# Patient Record
Sex: FEMALE | Race: WHITE | NOT HISPANIC OR LATINO | Employment: FULL TIME | URBAN - METROPOLITAN AREA
[De-identification: names, ages, dates, MRNs, and addresses within clinical notes are randomized per-mention and may not be internally consistent; named-entity substitution may affect disease eponyms.]

---

## 2023-11-16 ENCOUNTER — HOSPITAL ENCOUNTER (EMERGENCY)
Facility: HOSPITAL | Age: 33
Discharge: HOME | End: 2023-11-16
Attending: EMERGENCY MEDICINE
Payer: COMMERCIAL

## 2023-11-16 ENCOUNTER — APPOINTMENT (OUTPATIENT)
Dept: RADIOLOGY | Facility: HOSPITAL | Age: 33
End: 2023-11-16
Payer: COMMERCIAL

## 2023-11-16 VITALS
SYSTOLIC BLOOD PRESSURE: 145 MMHG | HEART RATE: 86 BPM | RESPIRATION RATE: 16 BRPM | HEIGHT: 66 IN | OXYGEN SATURATION: 98 % | BODY MASS INDEX: 47.09 KG/M2 | DIASTOLIC BLOOD PRESSURE: 71 MMHG | TEMPERATURE: 97.8 F | WEIGHT: 293 LBS

## 2023-11-16 DIAGNOSIS — N20.1 URETERAL STONE: ICD-10-CM

## 2023-11-16 DIAGNOSIS — N30.01 ACUTE CYSTITIS WITH HEMATURIA: Primary | ICD-10-CM

## 2023-11-16 PROBLEM — F32.5 MAJOR DEPRESSIVE DISORDER WITH SINGLE EPISODE, IN FULL REMISSION (CMS-HCC): Status: ACTIVE | Noted: 2023-11-16

## 2023-11-16 PROBLEM — J02.9 PHARYNGITIS: Status: ACTIVE | Noted: 2023-11-16

## 2023-11-16 PROBLEM — F41.1 GAD (GENERALIZED ANXIETY DISORDER): Status: ACTIVE | Noted: 2023-11-16

## 2023-11-16 PROBLEM — F41.0 PANIC DISORDER WITHOUT AGORAPHOBIA WITH MODERATE PANIC ATTACKS: Status: ACTIVE | Noted: 2023-11-16

## 2023-11-16 PROBLEM — H92.01 ACUTE PAIN OF RIGHT EAR: Status: ACTIVE | Noted: 2023-11-16

## 2023-11-16 PROBLEM — Z97.3 WEARS EYEGLASSES: Status: ACTIVE | Noted: 2023-11-16

## 2023-11-16 LAB
ALBUMIN SERPL BCP-MCNC: 4.8 G/DL (ref 3.4–5)
ALP SERPL-CCNC: 37 U/L (ref 33–110)
ALT SERPL W P-5'-P-CCNC: 32 U/L (ref 7–45)
ANION GAP SERPL CALC-SCNC: 15 MMOL/L (ref 10–20)
APPEARANCE UR: ABNORMAL
AST SERPL W P-5'-P-CCNC: 26 U/L (ref 9–39)
BACTERIA #/AREA URNS AUTO: ABNORMAL /HPF
BASOPHILS # BLD AUTO: 0.04 X10*3/UL (ref 0–0.1)
BASOPHILS NFR BLD AUTO: 0.3 %
BILIRUB SERPL-MCNC: 0.5 MG/DL (ref 0–1.2)
BILIRUB UR STRIP.AUTO-MCNC: NEGATIVE MG/DL
BUN SERPL-MCNC: 14 MG/DL (ref 6–23)
CALCIUM SERPL-MCNC: 9.7 MG/DL (ref 8.6–10.6)
CHLORIDE SERPL-SCNC: 102 MMOL/L (ref 98–107)
CO2 SERPL-SCNC: 27 MMOL/L (ref 21–32)
COLOR UR: ABNORMAL
CREAT SERPL-MCNC: 1.14 MG/DL (ref 0.5–1.05)
EOSINOPHIL # BLD AUTO: 0.05 X10*3/UL (ref 0–0.7)
EOSINOPHIL NFR BLD AUTO: 0.3 %
ERYTHROCYTE [DISTWIDTH] IN BLOOD BY AUTOMATED COUNT: 14.6 % (ref 11.5–14.5)
GFR SERPL CREATININE-BSD FRML MDRD: 66 ML/MIN/1.73M*2
GLUCOSE SERPL-MCNC: 116 MG/DL (ref 74–99)
GLUCOSE UR STRIP.AUTO-MCNC: NEGATIVE MG/DL
HCT VFR BLD AUTO: 45 % (ref 36–46)
HGB BLD-MCNC: 14.7 G/DL (ref 12–16)
HOLD SPECIMEN: NORMAL
IMM GRANULOCYTES # BLD AUTO: 0.06 X10*3/UL (ref 0–0.7)
IMM GRANULOCYTES NFR BLD AUTO: 0.4 % (ref 0–0.9)
KETONES UR STRIP.AUTO-MCNC: ABNORMAL MG/DL
LEUKOCYTE ESTERASE UR QL STRIP.AUTO: ABNORMAL
LIPASE SERPL-CCNC: 18 U/L (ref 9–82)
LYMPHOCYTES # BLD AUTO: 1.89 X10*3/UL (ref 1.2–4.8)
LYMPHOCYTES NFR BLD AUTO: 12.4 %
MCH RBC QN AUTO: 27.7 PG (ref 26–34)
MCHC RBC AUTO-ENTMCNC: 32.7 G/DL (ref 32–36)
MCV RBC AUTO: 85 FL (ref 80–100)
MONOCYTES # BLD AUTO: 0.69 X10*3/UL (ref 0.1–1)
MONOCYTES NFR BLD AUTO: 4.5 %
MUCOUS THREADS #/AREA URNS AUTO: ABNORMAL /LPF
NEUTROPHILS # BLD AUTO: 12.47 X10*3/UL (ref 1.2–7.7)
NEUTROPHILS NFR BLD AUTO: 82.1 %
NITRITE UR QL STRIP.AUTO: POSITIVE
NRBC BLD-RTO: 0 /100 WBCS (ref 0–0)
PH UR STRIP.AUTO: 5 [PH]
PLATELET # BLD AUTO: 344 X10*3/UL (ref 150–450)
POTASSIUM SERPL-SCNC: 3.9 MMOL/L (ref 3.5–5.3)
PROT SERPL-MCNC: 8.3 G/DL (ref 6.4–8.2)
PROT UR STRIP.AUTO-MCNC: ABNORMAL MG/DL
RBC # BLD AUTO: 5.31 X10*6/UL (ref 4–5.2)
RBC # UR STRIP.AUTO: ABNORMAL /UL
RBC #/AREA URNS AUTO: >20 /HPF
SODIUM SERPL-SCNC: 140 MMOL/L (ref 136–145)
SP GR UR STRIP.AUTO: 1.03
SQUAMOUS #/AREA URNS AUTO: ABNORMAL /HPF
UROBILINOGEN UR STRIP.AUTO-MCNC: <2 MG/DL
WBC # BLD AUTO: 15.2 X10*3/UL (ref 4.4–11.3)
WBC #/AREA URNS AUTO: >50 /HPF

## 2023-11-16 PROCEDURE — 99284 EMERGENCY DEPT VISIT MOD MDM: CPT | Mod: 25

## 2023-11-16 PROCEDURE — 99285 EMERGENCY DEPT VISIT HI MDM: CPT | Performed by: EMERGENCY MEDICINE

## 2023-11-16 PROCEDURE — 99285 EMERGENCY DEPT VISIT HI MDM: CPT | Mod: 25 | Performed by: EMERGENCY MEDICINE

## 2023-11-16 PROCEDURE — 87086 URINE CULTURE/COLONY COUNT: CPT

## 2023-11-16 PROCEDURE — 85025 COMPLETE CBC W/AUTO DIFF WBC: CPT

## 2023-11-16 PROCEDURE — 2500000004 HC RX 250 GENERAL PHARMACY W/ HCPCS (ALT 636 FOR OP/ED)

## 2023-11-16 PROCEDURE — 96361 HYDRATE IV INFUSION ADD-ON: CPT

## 2023-11-16 PROCEDURE — 87186 SC STD MICRODIL/AGAR DIL: CPT

## 2023-11-16 PROCEDURE — 2550000001 HC RX 255 CONTRASTS: Performed by: EMERGENCY MEDICINE

## 2023-11-16 PROCEDURE — 80053 COMPREHEN METABOLIC PANEL: CPT

## 2023-11-16 PROCEDURE — 96375 TX/PRO/DX INJ NEW DRUG ADDON: CPT

## 2023-11-16 PROCEDURE — 81001 URINALYSIS AUTO W/SCOPE: CPT

## 2023-11-16 PROCEDURE — 74177 CT ABD & PELVIS W/CONTRAST: CPT | Performed by: STUDENT IN AN ORGANIZED HEALTH CARE EDUCATION/TRAINING PROGRAM

## 2023-11-16 PROCEDURE — 74177 CT ABD & PELVIS W/CONTRAST: CPT

## 2023-11-16 PROCEDURE — 96365 THER/PROPH/DIAG IV INF INIT: CPT

## 2023-11-16 PROCEDURE — 83690 ASSAY OF LIPASE: CPT

## 2023-11-16 PROCEDURE — 36415 COLL VENOUS BLD VENIPUNCTURE: CPT

## 2023-11-16 RX ORDER — CEFDINIR 300 MG/1
300 CAPSULE ORAL 2 TIMES DAILY
Qty: 20 CAPSULE | Refills: 0 | Status: SHIPPED | OUTPATIENT
Start: 2023-11-16 | End: 2023-11-28 | Stop reason: ALTCHOICE

## 2023-11-16 RX ORDER — DESOGESTREL AND ETHINYL ESTRADIOL 21-5 (28)
KIT ORAL
COMMUNITY
End: 2023-11-20 | Stop reason: ALTCHOICE

## 2023-11-16 RX ORDER — SERTRALINE HYDROCHLORIDE 100 MG/1
1 TABLET, FILM COATED ORAL DAILY
COMMUNITY
Start: 2019-10-10 | End: 2023-12-04 | Stop reason: SDUPTHER

## 2023-11-16 RX ORDER — OXYCODONE AND ACETAMINOPHEN 5; 325 MG/1; MG/1
1 TABLET ORAL EVERY 6 HOURS PRN
Qty: 5 TABLET | Refills: 0 | Status: SHIPPED | OUTPATIENT
Start: 2023-11-16 | End: 2023-11-20 | Stop reason: ALTCHOICE

## 2023-11-16 RX ORDER — HYDROXYZINE HYDROCHLORIDE 10 MG/1
TABLET, FILM COATED ORAL
COMMUNITY
Start: 2019-09-19 | End: 2023-11-20 | Stop reason: ALTCHOICE

## 2023-11-16 RX ORDER — ONDANSETRON HYDROCHLORIDE 2 MG/ML
4 INJECTION, SOLUTION INTRAVENOUS ONCE
Status: COMPLETED | OUTPATIENT
Start: 2023-11-16 | End: 2023-11-16

## 2023-11-16 RX ORDER — KETOROLAC TROMETHAMINE 30 MG/ML
30 INJECTION, SOLUTION INTRAMUSCULAR; INTRAVENOUS ONCE
Status: COMPLETED | OUTPATIENT
Start: 2023-11-16 | End: 2023-11-16

## 2023-11-16 RX ORDER — CEFTRIAXONE 1 G/50ML
1 INJECTION, SOLUTION INTRAVENOUS EVERY 12 HOURS
Status: DISCONTINUED | OUTPATIENT
Start: 2023-11-16 | End: 2023-11-16 | Stop reason: HOSPADM

## 2023-11-16 RX ADMIN — SODIUM CHLORIDE, POTASSIUM CHLORIDE, SODIUM LACTATE AND CALCIUM CHLORIDE 1000 ML: 600; 310; 30; 20 INJECTION, SOLUTION INTRAVENOUS at 07:46

## 2023-11-16 RX ADMIN — ONDANSETRON 4 MG: 2 INJECTION INTRAMUSCULAR; INTRAVENOUS at 07:47

## 2023-11-16 RX ADMIN — CEFTRIAXONE SODIUM 1 G: 1 INJECTION, SOLUTION INTRAVENOUS at 09:37

## 2023-11-16 RX ADMIN — KETOROLAC TROMETHAMINE 30 MG: 30 INJECTION, SOLUTION INTRAMUSCULAR; INTRAVENOUS at 09:37

## 2023-11-16 RX ADMIN — SODIUM CHLORIDE 1000 ML: 0.9 INJECTION, SOLUTION INTRAVENOUS at 09:05

## 2023-11-16 RX ADMIN — IOHEXOL 80 ML: 350 INJECTION, SOLUTION INTRAVENOUS at 08:04

## 2023-11-16 ASSESSMENT — LIFESTYLE VARIABLES
HAVE YOU EVER FELT YOU SHOULD CUT DOWN ON YOUR DRINKING: NO
EVER HAD A DRINK FIRST THING IN THE MORNING TO STEADY YOUR NERVES TO GET RID OF A HANGOVER: NO
EVER FELT BAD OR GUILTY ABOUT YOUR DRINKING: NO
HAVE PEOPLE ANNOYED YOU BY CRITICIZING YOUR DRINKING: NO
REASON UNABLE TO ASSESS: NO

## 2023-11-16 ASSESSMENT — COLUMBIA-SUICIDE SEVERITY RATING SCALE - C-SSRS
5. HAVE YOU STARTED TO WORK OUT OR WORKED OUT THE DETAILS OF HOW TO KILL YOURSELF? DO YOU INTEND TO CARRY OUT THIS PLAN?: NO
6. HAVE YOU EVER DONE ANYTHING, STARTED TO DO ANYTHING, OR PREPARED TO DO ANYTHING TO END YOUR LIFE?: NO
1. IN THE PAST MONTH, HAVE YOU WISHED YOU WERE DEAD OR WISHED YOU COULD GO TO SLEEP AND NOT WAKE UP?: NO
4. HAVE YOU HAD THESE THOUGHTS AND HAD SOME INTENTION OF ACTING ON THEM?: NO
2. HAVE YOU ACTUALLY HAD ANY THOUGHTS OF KILLING YOURSELF?: NO

## 2023-11-16 NOTE — ED NOTES
Pt discharge to home IV removed intact, bleeding controlled, Vitals stable, reviewed discharge instructions with patient and  prescriptions. Pt ambulated out of ER.      Lisbeth Nunez RN  11/16/23 7093

## 2023-11-16 NOTE — DISCHARGE INSTRUCTIONS
Please return to the emergency department, should you have any worsening symptoms, are unable to get an appointment with your primary care physician within the discussed time frame, or have any further concerns.     Return to the emergency department should you experience fevers, chills no improvement of her pain in the next few days,, or any decreased urine output.      Please follow up with: Urology in the next few days.  Please call the clinic at [phone] for an appointment.    You make take ibuprofen or tylenol for pain. You may alternate ibuprofen and tylenol every 6 hours for better pain control.    Do not exceed 2400mg of ibuprofen or 4000mg of tylenol in a 24 hour period.    If you were given antibiotics, please complete the entire course. If you are unable to tolerate your antibiotics, please follow up with your primary care doctor or return to the emergency department.

## 2023-11-16 NOTE — ED TRIAGE NOTES
PT to the ED for RLQ pain onset last night. PT tenderness in the RLQ but has relief upon palpation over the area. C/o nausea no vomiting. No urinary symptoms or vaginal bleeding.

## 2023-11-16 NOTE — ED PROVIDER NOTES
HPI:      Limitations to History: none   Additional History Obtained from: none   External records reviewed: none     Chief Complaint   Patient presents with    Abdominal Pain          Priscilla Lam is a 32 y.o. female with no past medical history presenting to the ED with right lower quadrant abdominal pain that woke her from sleep at 2 AM this morning.  Patient noted some nausea with the pain, but denies any vomiting.  The pain does not radiate to any other areas, but feels like a sharp stabbing pain.  Patient denies any fevers, chills, shortness of breath, dysuria, hematuria, or any vaginal discharge.  Patient denies any prior episodes of this.       Past Medical History:   Diagnosis Date    Major depressive disorder, single episode, mild (CMS/[facility]) 01/29/2020    Major depressive disorder, single episode, mild degree    Other general symptoms and signs 02/16/2018    Flu-like symptoms    Personal history of other diseases of the nervous system and sense organs     History of myopia    Personal history of other specified conditions 02/16/2018    History of fever     Past Surgical History:   Procedure Laterality Date    OTHER SURGICAL HISTORY  09/19/2019    [place] tooth extraction        No Known Allergies    --------------------------------------------------------------------------------------------------------------------------------------    VS: As documented in the triage note and EMR flowsheet from this visit were reviewed.  Temp 37.6 °C (99.7 °F) HR 83 BP (!) 160/98 RR 18 Sat 97 % on      Physical Exam:  GEN:  no acute distress, appears comfortable. Conversational and appropriate.    HEENT: Normocephalic, atraumatic. Conjunctiva pink with no redness or exudates. Hearing grossly intact. Moist mucous membranes.  CARDIO: Normal rate and regular rhythm. Normal S1, S2  without murmurs, rubs, or gallops.   PULM: Clear to auscultation bilaterally. No rales, rhonchi, or wheezes. No accessory muscle use or stridor.  Speaking in full sentences.  GI: Soft, tenderness to palpation of the right lower quadrant, non-distended. No rebound tenderness or guarding.  Minimal CVA tenderness.  SKIN: Warm and dry, no rashes, lesions, petechiae, or purpura.  MSK: ROM intact in all 4 extremities without contractures or pain. No peripheral edema, contusions, or wounds.    NEURO: A&Ox3, No focal findings identified. No confusion or gross mental status changes.  PSYCH: Appropriate mood and behavior, converses and responds appropriately during exam.        ---------------------------------------------------------------------------------------------------------------------------------------    Given in the ED:   Medications   ondansetron (Zofran) injection 4 mg (4 mg intravenous Given 11/16/23 0747)   lactated Ringer's bolus 1,000 mL (0 mL intravenous Stopped 11/16/23 0846)   iohexol (OMNIPaque) 350 mg iodine/mL solution 80 mL (80 mL intravenous Given 11/16/23 0804)   sodium chloride 0.9 % bolus 1,000 mL (0 mL intravenous Stopped 11/16/23 0935)   ketorolac (Toradol) injection 30 mg (30 mg intravenous Given 11/16/23 0937)          Work up: All labs and imaging were independently reviewed by me.    Labs Reviewed   URINE CULTURE - Abnormal       Result Value    Urine Culture >100,000 Enteric bacilli (*)     Narrative:     Identification and/or susceptibilities to follow   CBC WITH AUTO DIFFERENTIAL - Abnormal    WBC 15.2 (*)     nRBC 0.0      RBC 5.31 (*)     Hemoglobin 14.7      Hematocrit 45.0      MCV 85      MCH 27.7      MCHC 32.7      RDW 14.6 (*)     Platelets 344      Neutrophils % 82.1      Immature Granulocytes %, Automated 0.4      Lymphocytes % 12.4      Monocytes % 4.5      Eosinophils % 0.3      Basophils % 0.3      Neutrophils Absolute 12.47 (*)     Immature Granulocytes Absolute, Automated 0.06      Lymphocytes Absolute 1.89      Monocytes Absolute 0.69      Eosinophils Absolute 0.05      Basophils Absolute 0.04     COMPREHENSIVE  METABOLIC PANEL - Abnormal    Glucose 116 (*)     Sodium 140      Potassium 3.9      Chloride 102      Bicarbonate 27      Anion Gap 15      Urea Nitrogen 14      Creatinine 1.14 (*)     eGFR 66      Calcium 9.7      Albumin 4.8      Alkaline Phosphatase 37      Total Protein 8.3 (*)     AST 26      Bilirubin, Total 0.5      ALT 32     URINALYSIS WITH REFLEX MICROSCOPIC AND CULTURE - Abnormal    Color, Urine Lulu (*)     Appearance, Urine Hazy (*)     Specific Gravity, Urine 1.030      pH, Urine 5.0      Protein, Urine 100 (2+) (*)     Glucose, Urine NEGATIVE      Blood, Urine LARGE (3+) (*)     Ketones, Urine 5 (TRACE) (*)     Bilirubin, Urine NEGATIVE      Urobilinogen, Urine <2.0      Nitrite, Urine POSITIVE (*)     Leukocyte Esterase, Urine MODERATE (2+) (*)    MICROSCOPIC ONLY, URINE - Abnormal    WBC, Urine >50 (*)     RBC, Urine >20 (*)     Squamous Epithelial Cells, Urine 1-9 (SPARSE)      Bacteria, Urine 3+ (*)     Mucus, Urine 4+     LIPASE - Normal    Lipase 18      Narrative:     Venipuncture immediately after or during the administration of Metamizole may lead to falsely low results. Testing should be performed immediately prior to Metamizole dosing.   URINALYSIS WITH REFLEX MICROSCOPIC AND CULTURE    Narrative:     The following orders were created for panel order Urinalysis with Reflex Microscopic and Culture.  Procedure                               Abnormality         Status                     ---------                               -----------         ------                     Urinalysis with Reflex M...[[id number]]  Abnormal            Final result               Extra Urine Gray Tube[[id number]]                            Final result                 Please view results for these tests on the individual orders.   EXTRA URINE GRAY TUBE    Extra Tube Hold for add-ons.         CT abdomen pelvis w IV contrast   Final Result   1.  Mild right hydroureteronephrosis, with trace right perinephric   fat  stranding and delayed nephrogram secondary to obstructive 7 mm   distal right ureteral stone at the level of the right UVJ.   2. Findings suggestive hepatic steatosis for correlation with liver   function tests.   3. Additional findings as described above.        I personally reviewed the images/study and I agree with the findings   as stated by Chato Metzger. This study was   interpreted at [facility],   [place], [place].        MACRO:   None        Signed by: Dayron Alonso 11/16/2023 9:23 AM   Dictation workstation:   [id number]          Medications   ondansetron (Zofran) injection 4 mg (4 mg intravenous Given 11/16/23 0747)   lactated Ringer's bolus 1,000 mL (0 mL intravenous Stopped 11/16/23 0846)   iohexol (OMNIPaque) 350 mg iodine/mL solution 80 mL (80 mL intravenous Given 11/16/23 0804)   sodium chloride 0.9 % bolus 1,000 mL (0 mL intravenous Stopped 11/16/23 0935)   ketorolac (Toradol) injection 30 mg (30 mg intravenous Given 11/16/23 0937)       ED Course as of 11/17/23 1207   Thu Nov 16, 2023   0830 UA with +pyruia, concerns for infection, started on ceftriaxone. Given additional 1L IVF [AD]   0845 Given IVF, pain medications  [AD]   0959 Ct ABD/PEL with + urethral  [AD]      ED Course User Index  [AD] Denton Malloy, DO         Diagnoses as of 11/17/23 1207   Acute cystitis with hematuria   Ureteral stone           MDM:  Briefly, this is a 33-year-old female with no past medical history presenting to the ED with right lower quadrant abdominal pain that woke her from sleep at 2 AM this morning, concerning for kidney stone and was found to have a 7 mm obstructive stone, with mild right hydro ureter, which I do believe is causing her pain.  She was also found to have a positive UTI, given Rocephin IV.  Pain control with IV pain medications.  Recent labs were drawn and discussed findings of mildly elevated creatinine and elevated white blood cell count,  likely related to UTI, and obstructing stone.  At this time, less concern for infected stone given overall well-appearing female.  Will be prescribed antibiotics for UTI.  Discussed outpatient instructions - Patient will have her creatinine checked at her follow-up appoint with her primary care physician next week.  She was given instructions to follow-up with urology within the next few days for further management.  She is comfortable plan for discharge home with urology follow-up and recheck of creatinine and her primary care appointment.  Advised to return to the emergency department should she have any worsening symptoms, fevers, inability to urinate, or significant blood in the urine.      Impression:   1. Acute cystitis with hematuria    2. Ureteral stone        Social Determinants of Health: Patient notes that she has a primary care appointment next week, and agrees to    Plan and Disposition:   Discharge home, return precautions given.        Return if worse. They understand return precautions and discharge instructions. Patient was in agreement with this plan.            Denton Floyd DO  Emergency Medicine, PGY-2      Patient was seen and evaluated by the attending physician. The attending ED physician agrees with the plan. Patient and/or patient´s representative was counseled regarding labs, imaging, likely diagnosis, and plan. All questions were answered.    Disclaimer: This note was dictated by speech recognition.  Attempt at proofreading was made to minimize errors.  Errors in transcription may be present.  Please call if questions.    I saw and evaluated the patient. I personally obtained the key and critical portions of the history and physical exam or was physically present for key and critical portions performed by the resident/fellow/CHIO. I reviewed the resident/fellow/CHIO's documentation and discussed the patient with the resident/fellow/CHIO. I agree with the resident/fellow's medical decision  making as documented in the note.    Also, I had a long discussion with the patient and she is aware of the size of the stone, the fact that her urinary tract infection could potentially lead to an infected stone, and that her creatinine is slightly elevated which could lead to a severe ISAÍAS.  However at this time she is asymptomatic with pain that is very well controlled.  She agrees to stay very well-hydrated and will take antibiotics at home.  She also agrees to follow-up with her primary care provider as well as urology in the outpatient setting and finally agrees to very strict return precautions.  If she has any worsening pain, infectious signs or symptoms, fevers, change in urinary output, or other concerning symptoms she agrees to come immediately back to the emergency department.  At this time I do think that her clinical picture is consistent with a kidney stone and a UTI rather than a truly infected stone.    Prasanna Smith    Emergency Medicine         Prasanna Smith MD  11/17/23 1208       Prasanna Smith MD  11/17/23 1209       Denton Malloy, DO  Resident  12/05/23 1895       Prasanna Smith MD  12/05/23 7695

## 2023-11-18 LAB — BACTERIA UR CULT: ABNORMAL

## 2023-11-20 ENCOUNTER — OFFICE VISIT (OUTPATIENT)
Dept: PRIMARY CARE | Facility: CLINIC | Age: 33
End: 2023-11-20
Payer: COMMERCIAL

## 2023-11-20 ENCOUNTER — APPOINTMENT (OUTPATIENT)
Dept: PRIMARY CARE | Facility: CLINIC | Age: 33
End: 2023-11-20
Payer: COMMERCIAL

## 2023-11-20 VITALS
OXYGEN SATURATION: 98 % | WEIGHT: 293 LBS | DIASTOLIC BLOOD PRESSURE: 88 MMHG | HEART RATE: 88 BPM | HEIGHT: 66 IN | SYSTOLIC BLOOD PRESSURE: 134 MMHG | BODY MASS INDEX: 47.09 KG/M2

## 2023-11-20 DIAGNOSIS — N20.1 URETERAL STONE: Primary | ICD-10-CM

## 2023-11-20 DIAGNOSIS — F32.5 MAJOR DEPRESSIVE DISORDER WITH SINGLE EPISODE, IN FULL REMISSION (CMS-HCC): ICD-10-CM

## 2023-11-20 DIAGNOSIS — I10 ELEVATED BLOOD PRESSURE READING IN OFFICE WITH DIAGNOSIS OF HYPERTENSION: ICD-10-CM

## 2023-11-20 DIAGNOSIS — E66.1 CLASS 3 DRUG-INDUCED OBESITY WITH BODY MASS INDEX (BMI) OF 45.0 TO 49.9 IN ADULT, UNSPECIFIED WHETHER SERIOUS COMORBIDITY PRESENT (MULTI): ICD-10-CM

## 2023-11-20 DIAGNOSIS — Z78.9 USES BIRTH CONTROL: ICD-10-CM

## 2023-11-20 DIAGNOSIS — F41.1 GAD (GENERALIZED ANXIETY DISORDER): ICD-10-CM

## 2023-11-20 DIAGNOSIS — Z00.00 HEALTHCARE MAINTENANCE: ICD-10-CM

## 2023-11-20 DIAGNOSIS — Z13.220 SCREENING, LIPID: ICD-10-CM

## 2023-11-20 DIAGNOSIS — E55.9 VITAMIN D DEFICIENCY: ICD-10-CM

## 2023-11-20 DIAGNOSIS — R53.83 OTHER FATIGUE: ICD-10-CM

## 2023-11-20 DIAGNOSIS — Z01.419 WELL WOMAN EXAM: ICD-10-CM

## 2023-11-20 PROBLEM — J02.9 PHARYNGITIS: Status: RESOLVED | Noted: 2023-11-16 | Resolved: 2023-11-20

## 2023-11-20 PROBLEM — E66.813 CLASS 3 DRUG-INDUCED OBESITY WITH BODY MASS INDEX (BMI) OF 45.0 TO 49.9 IN ADULT: Status: ACTIVE | Noted: 2023-11-20

## 2023-11-20 PROCEDURE — 3075F SYST BP GE 130 - 139MM HG: CPT | Performed by: NURSE PRACTITIONER

## 2023-11-20 PROCEDURE — 3008F BODY MASS INDEX DOCD: CPT | Performed by: NURSE PRACTITIONER

## 2023-11-20 PROCEDURE — 99204 OFFICE O/P NEW MOD 45 MIN: CPT | Performed by: NURSE PRACTITIONER

## 2023-11-20 PROCEDURE — 1036F TOBACCO NON-USER: CPT | Performed by: NURSE PRACTITIONER

## 2023-11-20 PROCEDURE — 3079F DIAST BP 80-89 MM HG: CPT | Performed by: NURSE PRACTITIONER

## 2023-11-20 RX ORDER — NORETHINDRONE 0.35 MG/1
1 TABLET ORAL DAILY
COMMUNITY
End: 2023-11-28 | Stop reason: SDUPTHER

## 2023-11-20 ASSESSMENT — PATIENT HEALTH QUESTIONNAIRE - PHQ9
1. LITTLE INTEREST OR PLEASURE IN DOING THINGS: NOT AT ALL
2. FEELING DOWN, DEPRESSED OR HOPELESS: NOT AT ALL
SUM OF ALL RESPONSES TO PHQ9 QUESTIONS 1 & 2: 0

## 2023-11-20 NOTE — PROGRESS NOTES
General Medical Management Note    32 y.o. female presents to establish as a new pt.   Last CPE approx 5 yrs ago    HPI    States that she was in the ER 11/16/23 for right ureteral stone.   Was instructed to follow up with urology  Currently denies abd pain or urinary discomfort.   Still taking Omnicef    Anxiety and depression:   Med: Zoloft  States she has been on med since 2019.   Managed by an online provider at [facility]    Birth control:   Managed by an online provider at [facility].  When questioned about her dad's blood clots-she believes it related to an old leg injury. She is going to verify with dad.     Blood pressure was elevated in ER.   Denies CP, SOB, dizziness, HA        Past Medical History:   Diagnosis Date    Kidney stones     Major depressive disorder, single episode, mild (CMS/HCC) 01/29/2020    Major depressive disorder, single episode, mild degree    Other general symptoms and signs 02/16/2018    Flu-like symptoms    Personal history of other diseases of the nervous system and sense organs     History of myopia    Personal history of other specified conditions 02/16/2018    History of fever      Past Surgical History:   Procedure Laterality Date    OTHER SURGICAL HISTORY  09/19/2019    [place] tooth extraction    WISDOM TOOTH EXTRACTION       Family History   Problem Relation Name Age of Onset    Hypertension Mother Lawanda Lam     Blood clot Father Emilio Lam     Hypertension Maternal Grandmother Ivory Beasley     Pancreatic cancer Maternal Grandmother Ivory Beasley       Social History     Socioeconomic History    Marital status: Single     Spouse name: Not on file    Number of children: Not on file    Years of education: Not on file    Highest education level: Not on file   Occupational History    Occupation: CT tech   Tobacco Use    Smoking status: Never    Smokeless tobacco: Never   Substance and Sexual Activity    Alcohol use: Yes     Alcohol/week: 2.0 standard  "drinks of alcohol     Types: 2 Standard drinks or equivalent per week    Drug use: Never    Sexual activity: Never   Other Topics Concern    Not on file   Social History Narrative    Not on file     Social Determinants of Health     Financial Resource Strain: Not on file   Food Insecurity: Not on file   Transportation Needs: Not on file   Physical Activity: Not on file   Stress: Not on file   Social Connections: Not on file   Intimate Partner Violence: Not on file   Housing Stability: Not on file       Current Outpatient Medications on File Prior to Visit   Medication Sig Dispense Refill    cefdinir (Omnicef) 300 mg capsule Take 1 capsule (300 mg) by mouth 2 times a day for 10 days. 20 capsule 0    norethindrone (Micronor) 0.35 mg tablet Take 1 tablet (0.35 mg) by mouth once daily.      sertraline (Zoloft) 100 mg tablet Take 1 tablet (100 mg) by mouth once daily.      [DISCONTINUED] hydrOXYzine HCL (Atarax) 10 mg tablet Take by mouth.      [] oxyCODONE-acetaminophen (Percocet) 5-325 mg tablet Take 1 tablet by mouth every 6 hours if needed for severe pain (7 - 10) for up to 3 days. 5 tablet 0    [DISCONTINUED] desog-e.estradioL/e.estradioL (Kariva) 0.15-0.02 mgx21 /0.01 mg x 5 tablet Take by mouth.       No current facility-administered medications on file prior to visit.       No Known Allergies      Visit Vitals  /88   Pulse 88   Ht 1.676 m (5' 6\")   Wt 139 kg (306 lb)   SpO2 98%   BMI 49.39 kg/m²   OB Status Having periods   Smoking Status Never   BSA 2.54 m²        PHYSICAL EXAM:  Alert and oriented x3.  Eyes: EOM grossly intact  Neck supple without lymph adenopathy or carotid bruit.  No masses or thyromegaly  Heart regular rate and rhythm without murmur.  Lungs clear to auscultation.  Legs without edema.  Gait is non-antalgic  Speech clear.  Hearing adequate.      DIAGNOSIS/PLAN:  New patient    1. Ureteral stone  - Referral to Urology; Future    2. NED (generalized anxiety disorder)  Follow-up with " specialist per their discretion/direction.     3. Major depressive disorder with single episode, in full remission (CMS/[facility])  Follow-up with specialist per their discretion/direction.     4. 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   Patient also encouraged to increase water intake to 80 ounces/day.   Start or continue exercise for at least 30 minutes a day on most days of the week.     offers various ways to learn about healthy eating and healthy weight loss.       5. Uses birth control/ Well woman exam  - Referral to Obstetrics / Gynecology; Future  It is important to clarify with dad if his blood clots are related to a prior injury. This information should be communicated with provider ordering birth control.      6. Elevated blood pressure reading in office without diagnosis of HTN:      Discussed importance of good blood pressure control to avoid long-term complications such as heart attack and stroke.  Patient is aware that blood pressure goal is less than 130/80.   Maintaining a regular exercise program and body mass index (BMI) less than 25 as well as a diet lower in carbohydrates will help reach these goals.   Info regarding the DASH diet:  https://www.nhlbi.nih.gov/health-topics/dash-eating-plan.  If you are monitoring your blood pressure at home, please bring your blood pressure cuff at least once a year so we can complete comparative readings.  She will check her b/p at work. Report readings at visit.       Medications refills will be completed as discussed.     Any labs or testing that is ordered will be reviewed and the results will be in your chart .   You can review these via  [employer].     Follow up for CPE. Labs ordered    Prescriptions will not be filled unless you are compliant with your follow-up  appointments or have a follow-up appointment scheduled as per the instruction of your provider. Refills for medications should be requested at the time of your office visit.     Please allow one week for refill requests to be completed.     Contact office with any questions or concerns.   Preferred communication is via  [employer]  Please contact [email or web address] if having issues with  [employer]    Chelly Wong [facility]  [address], [address]  [place] [address]  Phone: [phone]    **Charting was completed using voice recognition technology and may include unintended errors**

## 2023-11-28 ENCOUNTER — OFFICE VISIT (OUTPATIENT)
Dept: OBSTETRICS AND GYNECOLOGY | Facility: CLINIC | Age: 33
End: 2023-11-28
Payer: COMMERCIAL

## 2023-11-28 ENCOUNTER — LAB (OUTPATIENT)
Dept: LAB | Facility: LAB | Age: 33
End: 2023-11-28
Payer: COMMERCIAL

## 2023-11-28 VITALS
SYSTOLIC BLOOD PRESSURE: 160 MMHG | BODY MASS INDEX: 47.09 KG/M2 | WEIGHT: 293 LBS | DIASTOLIC BLOOD PRESSURE: 106 MMHG | HEIGHT: 66 IN

## 2023-11-28 DIAGNOSIS — Z30.41 SURVEILLANCE FOR BIRTH CONTROL, ORAL CONTRACEPTIVES: ICD-10-CM

## 2023-11-28 DIAGNOSIS — Z13.220 SCREENING, LIPID: ICD-10-CM

## 2023-11-28 DIAGNOSIS — Z01.419 WELL WOMAN EXAM WITH ROUTINE GYNECOLOGICAL EXAM: Primary | ICD-10-CM

## 2023-11-28 DIAGNOSIS — E55.9 VITAMIN D DEFICIENCY: ICD-10-CM

## 2023-11-28 DIAGNOSIS — I10 ELEVATED BLOOD PRESSURE READING IN OFFICE WITH DIAGNOSIS OF HYPERTENSION: ICD-10-CM

## 2023-11-28 DIAGNOSIS — Z12.4 PAP SMEAR FOR CERVICAL CANCER SCREENING: ICD-10-CM

## 2023-11-28 DIAGNOSIS — Z00.00 HEALTHCARE MAINTENANCE: ICD-10-CM

## 2023-11-28 DIAGNOSIS — R53.83 OTHER FATIGUE: ICD-10-CM

## 2023-11-28 LAB
25(OH)D3 SERPL-MCNC: 34 NG/ML (ref 30–100)
ALBUMIN SERPL BCP-MCNC: 4.4 G/DL (ref 3.4–5)
ALP SERPL-CCNC: 35 U/L (ref 33–110)
ALT SERPL W P-5'-P-CCNC: 27 U/L (ref 7–45)
ANION GAP SERPL CALC-SCNC: 15 MMOL/L (ref 10–20)
APPEARANCE UR: CLEAR
AST SERPL W P-5'-P-CCNC: 23 U/L (ref 9–39)
BILIRUB SERPL-MCNC: 0.4 MG/DL (ref 0–1.2)
BILIRUB UR STRIP.AUTO-MCNC: NEGATIVE MG/DL
BUN SERPL-MCNC: 11 MG/DL (ref 6–23)
CALCIUM SERPL-MCNC: 9 MG/DL (ref 8.6–10.6)
CHLORIDE SERPL-SCNC: 103 MMOL/L (ref 98–107)
CHOLEST SERPL-MCNC: 145 MG/DL (ref 0–199)
CHOLESTEROL/HDL RATIO: 3.6
CO2 SERPL-SCNC: 27 MMOL/L (ref 21–32)
COLOR UR: YELLOW
CREAT SERPL-MCNC: 0.81 MG/DL (ref 0.5–1.05)
ERYTHROCYTE [DISTWIDTH] IN BLOOD BY AUTOMATED COUNT: 14.2 % (ref 11.5–14.5)
GFR SERPL CREATININE-BSD FRML MDRD: >90 ML/MIN/1.73M*2
GLUCOSE SERPL-MCNC: 84 MG/DL (ref 74–99)
GLUCOSE UR STRIP.AUTO-MCNC: NEGATIVE MG/DL
HCT VFR BLD AUTO: 44.3 % (ref 36–46)
HDLC SERPL-MCNC: 39.8 MG/DL
HGB BLD-MCNC: 14 G/DL (ref 12–16)
KETONES UR STRIP.AUTO-MCNC: NEGATIVE MG/DL
LDLC SERPL CALC-MCNC: 82 MG/DL
LEUKOCYTE ESTERASE UR QL STRIP.AUTO: ABNORMAL
MCH RBC QN AUTO: 27.3 PG (ref 26–34)
MCHC RBC AUTO-ENTMCNC: 31.6 G/DL (ref 32–36)
MCV RBC AUTO: 86 FL (ref 80–100)
MUCOUS THREADS #/AREA URNS AUTO: ABNORMAL /LPF
NITRITE UR QL STRIP.AUTO: NEGATIVE
NON HDL CHOLESTEROL: 105 MG/DL (ref 0–149)
NRBC BLD-RTO: 0 /100 WBCS (ref 0–0)
PH UR STRIP.AUTO: 5 [PH]
PLATELET # BLD AUTO: 324 X10*3/UL (ref 150–450)
POTASSIUM SERPL-SCNC: 4.5 MMOL/L (ref 3.5–5.3)
PROT SERPL-MCNC: 7.3 G/DL (ref 6.4–8.2)
PROT UR STRIP.AUTO-MCNC: ABNORMAL MG/DL
RBC # BLD AUTO: 5.13 X10*6/UL (ref 4–5.2)
RBC # UR STRIP.AUTO: NEGATIVE /UL
RBC #/AREA URNS AUTO: ABNORMAL /HPF
SODIUM SERPL-SCNC: 140 MMOL/L (ref 136–145)
SP GR UR STRIP.AUTO: 1.03
SQUAMOUS #/AREA URNS AUTO: ABNORMAL /HPF
TRIGL SERPL-MCNC: 115 MG/DL (ref 0–149)
TSH SERPL-ACNC: 1.74 MIU/L (ref 0.44–3.98)
UROBILINOGEN UR STRIP.AUTO-MCNC: <2 MG/DL
VLDL: 23 MG/DL (ref 0–40)
WBC # BLD AUTO: 9.5 X10*3/UL (ref 4.4–11.3)
WBC #/AREA URNS AUTO: ABNORMAL /HPF

## 2023-11-28 PROCEDURE — 85027 COMPLETE CBC AUTOMATED: CPT

## 2023-11-28 PROCEDURE — 87624 HPV HI-RISK TYP POOLED RSLT: CPT

## 2023-11-28 PROCEDURE — 36415 COLL VENOUS BLD VENIPUNCTURE: CPT

## 2023-11-28 PROCEDURE — 1036F TOBACCO NON-USER: CPT | Performed by: NURSE PRACTITIONER

## 2023-11-28 PROCEDURE — 82306 VITAMIN D 25 HYDROXY: CPT

## 2023-11-28 PROCEDURE — 3077F SYST BP >= 140 MM HG: CPT | Performed by: NURSE PRACTITIONER

## 2023-11-28 PROCEDURE — 84443 ASSAY THYROID STIM HORMONE: CPT

## 2023-11-28 PROCEDURE — 81001 URINALYSIS AUTO W/SCOPE: CPT

## 2023-11-28 PROCEDURE — 99385 PREV VISIT NEW AGE 18-39: CPT | Performed by: NURSE PRACTITIONER

## 2023-11-28 PROCEDURE — 3008F BODY MASS INDEX DOCD: CPT | Performed by: NURSE PRACTITIONER

## 2023-11-28 PROCEDURE — 3080F DIAST BP >= 90 MM HG: CPT | Performed by: NURSE PRACTITIONER

## 2023-11-28 PROCEDURE — 80053 COMPREHEN METABOLIC PANEL: CPT

## 2023-11-28 PROCEDURE — 80061 LIPID PANEL: CPT

## 2023-11-28 PROCEDURE — 88175 CYTOPATH C/V AUTO FLUID REDO: CPT

## 2023-11-28 RX ORDER — NORETHINDRONE 0.35 MG/1
1 TABLET ORAL DAILY
Qty: 84 TABLET | Refills: 3 | Status: SHIPPED | OUTPATIENT
Start: 2023-11-28

## 2023-11-28 NOTE — PROGRESS NOTES
HPI: Priscilla Lam is a 33 y.o. 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    Cycles are amenorrheic on micronor, for about 2 years and doing well   LMP:  No LMP recorded.   Sexually active: no  STI concerns: no  Contraception: OCP  Last mammogram: never   Last pap: 5 years ago per pt  History of abnormal pap: no  Other gyn history: none   OB History        0    Para   0    Term   0       0    AB   0    Living   0       SAB   0    IAB   0    Ectopic   0    Multiple   0    Live Births   0              Past Medical History:  No date: Kidney stones  2020: Major depressive disorder, single episode, mild (CMS/HCC)      Comment:  Major depressive disorder, single episode, mild degree  2018: Other general symptoms and signs      Comment:  Flu-like symptoms  No date: Personal history of other diseases of the nervous system and   sense organs      Comment:  History of myopia  2018: Personal history of other specified conditions      Comment:  History of fever  2023: Pharyngitis   Past Surgical History:  2019: OTHER SURGICAL HISTORY      Comment:  [place] tooth extraction  No date: WISDOM TOOTH EXTRACTION   Social History    Socioeconomic History      Marital status: Single      Spouse name: Not on file      Number of children: Not on file      Years of education: Not on file      Highest education level: Not on file    Occupational History      Occupation: CT tech    Tobacco Use      Smoking status: Never      Smokeless tobacco: Never    Substance and Sexual Activity      Alcohol use: Yes        Alcohol/week: 2.0 standard drinks of alcohol        Types: 2 Standard drinks or  "equivalent per week      Drug use: Never      Sexual activity: Never    Other Topics      Concerns:        Not on file    Social History Narrative      Not on file    Social Determinants of Health  Financial Resource Strain: Not on file  Food Insecurity: Not on file  Transportation Needs: Not on file  Physical Activity: Not on file  Stress: Not on file  Social Connections: Not on file  Intimate Partner Violence: Not on file  Housing Stability: Not on file   Review of patient's family history indicates:  Problem: Hypertension      Relation: Mother          Name: Lawanda Lam              Age of Onset: (Not Specified)  Problem: Blood clot      Relation: Father          Name: Emilio Lam              Age of Onset: (Not Specified)  Problem: Hypertension      Relation: Maternal Grandmother          Name: Ivory Beasley              Age of Onset: (Not Specified)  Problem: Pancreatic cancer      Relation: Maternal Grandmother          Name: Ivory Beasley              Age of Onset: (Not Specified)     Current Outpatient Medications:   norethindrone (Micronor) 0.35 mg tablet, Take 1 tablet (0.35 mg) by mouth once daily., Disp: , Rfl:   sertraline (Zoloft) 100 mg tablet, Take 1 tablet (100 mg) by mouth once daily., Disp: , Rfl:           REVIEW OF SYSTEMS:  Breast. denies masses, nipple discharge  : denies dysuria, hematuria incontinence   Gl: denies change in bowel habits, blood in stools      PHYSICAL EXAM:  Vitals: BP (!) 160/106 (BP Location: Left arm, Patient Position: Sitting, BP Cuff Size: Large adult)   Ht 1.676 m (5' 6\")   Wt 139 kg (307 lb)   BMI 49.55 kg/m²   Gen: well appearing woman in NAD  HEENT: normocephalic, thyroid not enlarged, no lymphadenopathy  CV: no m/r/g  Chest: CTAB, no w/r/r  Breast: normal tissue bilaterally without masses, skin changes, lymphadenopathy   Abdomen: soft, nontender, no masses/hernias, liver and spleen not enlarged   Pelvic:  - external genitalia: normal  - vagina: normal  - cervix: " normal  - uterus: normal size, nontender  - adnexa: no palpable masses or tenderness  RECTAL: no external hemorrhoids; rectal exam deferred    ASSESSMENT/PLAN :    1) Health maintenance, Well-woman exam.  Pap done with HPV.  Mammogram starting age 41yo  Nutrition, exercise and routine health maintenance exams reviewed.  Calcium/Vitamin D supplementation information provided   Smoking cessation: non-smoker  2) Contraception: mini pills satisfactory, refills provided.   Desires to continue with same pills, discussed no history concerning if desires to switch to COCs.  3) STD screening: declines, no concerns  4) Follow up one year or sooner as needed

## 2023-11-28 NOTE — PROGRESS NOTES
Verified patient by name and date of birth. Patient presents for an annual exam, declines chaperone.

## 2023-12-04 ENCOUNTER — OFFICE VISIT (OUTPATIENT)
Dept: PRIMARY CARE | Facility: CLINIC | Age: 33
End: 2023-12-04
Payer: COMMERCIAL

## 2023-12-04 VITALS
DIASTOLIC BLOOD PRESSURE: 78 MMHG | SYSTOLIC BLOOD PRESSURE: 138 MMHG | OXYGEN SATURATION: 99 % | WEIGHT: 293 LBS | BODY MASS INDEX: 47.09 KG/M2 | HEIGHT: 66 IN | HEART RATE: 92 BPM

## 2023-12-04 DIAGNOSIS — E55.9 VITAMIN D DEFICIENCY: ICD-10-CM

## 2023-12-04 DIAGNOSIS — F41.1 GAD (GENERALIZED ANXIETY DISORDER): Primary | ICD-10-CM

## 2023-12-04 DIAGNOSIS — E55.9 VITAMIN D DEFICIENCY: Primary | ICD-10-CM

## 2023-12-04 DIAGNOSIS — E66.01 MORBID OBESITY WITH BMI OF 45.0-49.9, ADULT (MULTI): ICD-10-CM

## 2023-12-04 DIAGNOSIS — R03.0 ELEVATED BLOOD PRESSURE READING IN OFFICE WITHOUT DIAGNOSIS OF HYPERTENSION: ICD-10-CM

## 2023-12-04 DIAGNOSIS — E66.01 MORBID OBESITY WITH BMI OF 45.0-49.9, ADULT (MULTI): Primary | ICD-10-CM

## 2023-12-04 DIAGNOSIS — Z00.00 ENCOUNTER FOR HEALTH MAINTENANCE EXAMINATION IN ADULT: ICD-10-CM

## 2023-12-04 DIAGNOSIS — F32.5 MAJOR DEPRESSIVE DISORDER WITH SINGLE EPISODE, IN FULL REMISSION (CMS-HCC): ICD-10-CM

## 2023-12-04 PROBLEM — Z86.69 HISTORY OF VISUAL DISTURBANCE: Status: ACTIVE | Noted: 2023-12-04

## 2023-12-04 PROBLEM — N30.00 ACUTE CYSTITIS: Status: ACTIVE | Noted: 2023-11-16

## 2023-12-04 PROBLEM — R53.83 FATIGUE: Status: ACTIVE | Noted: 2023-11-28

## 2023-12-04 PROCEDURE — 99214 OFFICE O/P EST MOD 30 MIN: CPT | Performed by: NURSE PRACTITIONER

## 2023-12-04 PROCEDURE — 3075F SYST BP GE 130 - 139MM HG: CPT | Performed by: NURSE PRACTITIONER

## 2023-12-04 PROCEDURE — 3078F DIAST BP <80 MM HG: CPT | Performed by: NURSE PRACTITIONER

## 2023-12-04 PROCEDURE — 3008F BODY MASS INDEX DOCD: CPT | Performed by: NURSE PRACTITIONER

## 2023-12-04 PROCEDURE — 93000 ELECTROCARDIOGRAM COMPLETE: CPT | Performed by: NURSE PRACTITIONER

## 2023-12-04 PROCEDURE — 1036F TOBACCO NON-USER: CPT | Performed by: NURSE PRACTITIONER

## 2023-12-04 PROCEDURE — 99395 PREV VISIT EST AGE 18-39: CPT | Performed by: NURSE PRACTITIONER

## 2023-12-04 RX ORDER — HYDROCHLOROTHIAZIDE 25 MG/1
25 TABLET ORAL DAILY
Qty: 30 TABLET | Refills: 11 | Status: SHIPPED | OUTPATIENT
Start: 2023-12-04 | End: 2024-06-04 | Stop reason: SDUPTHER

## 2023-12-04 RX ORDER — SERTRALINE HYDROCHLORIDE 100 MG/1
100 TABLET, FILM COATED ORAL DAILY
Qty: 90 TABLET | Refills: 1 | Status: SHIPPED | OUTPATIENT
Start: 2023-12-04 | End: 2024-06-04 | Stop reason: SDUPTHER

## 2023-12-04 RX ORDER — ERGOCALCIFEROL 1.25 MG/1
50000 CAPSULE ORAL
Qty: 13 CAPSULE | Refills: 2 | Status: SHIPPED | OUTPATIENT
Start: 2023-12-04 | End: 2024-06-04 | Stop reason: WASHOUT

## 2023-12-04 ASSESSMENT — PROMIS GLOBAL HEALTH SCALE
RATE_GENERAL_HEALTH: GOOD
RATE_QUALITY_OF_LIFE: VERY GOOD
CARRYOUT_SOCIAL_ACTIVITIES: VERY GOOD
RATE_SOCIAL_SATISFACTION: VERY GOOD
RATE_MENTAL_HEALTH: VERY GOOD
RATE_AVERAGE_FATIGUE: MODERATE
RATE_PHYSICAL_HEALTH: FAIR
EMOTIONAL_PROBLEMS: SOMETIMES
CARRYOUT_PHYSICAL_ACTIVITIES: MOSTLY
RATE_AVERAGE_PAIN: 4

## 2023-12-04 ASSESSMENT — PATIENT HEALTH QUESTIONNAIRE - PHQ9
2. FEELING DOWN, DEPRESSED OR HOPELESS: NOT AT ALL
SUM OF ALL RESPONSES TO PHQ9 QUESTIONS 1 & 2: 0
1. LITTLE INTEREST OR PLEASURE IN DOING THINGS: NOT AT ALL

## 2023-12-04 NOTE — PROGRESS NOTES
"Annual Comprehensive Medical Exam:    33 y.o. female presents for annual comprehensive medical evaluation and preventive services screening.      Recent hospitalizations, surgeries or ER visits: denies     Diet:   mix of healthy and unhealthy  Caffeine per day: 24 ounces  Water per day: 60-90 ounces  Exercise: \"a little bit\"  Alcohol: 2 per week  Tobacco: denies  Dentist: overdue  Optometrist:   yearly  Pap/Pelvic: 11/28/23 Aida Stevens CNP    Allowed to report any questions or concerns.    Anxiety and depression:   Med: Zoloft  States she has been on med since 2019.   Managed by an online provider at [facility]  Would like me to take over ordering    Birth control: Gyn  Aida Stevens CNP      Past Medical History:   Diagnosis Date    Kidney stones     Major depressive disorder, single episode, mild (CMS/HCC) 01/29/2020    Major depressive disorder, single episode, mild degree    Other general symptoms and signs 02/16/2018    Flu-like symptoms    Personal history of other diseases of the nervous system and sense organs     History of myopia    Personal history of other specified conditions 02/16/2018    History of fever    Pharyngitis 11/16/2023      Past Surgical History:   Procedure Laterality Date    OTHER SURGICAL HISTORY  09/19/2019    Cope tooth extraction    WISDOM TOOTH EXTRACTION       Family History   Problem Relation Name Age of Onset    Hypertension Mother Lawanda Lam     Blood clot Father Emilio Lam     Hypertension Maternal Grandmother Ivory Beasley     Pancreatic cancer Maternal Grandmother Ivory Beasley       Social History     Socioeconomic History    Marital status: Single     Spouse name: Not on file    Number of children: Not on file    Years of education: Not on file    Highest education level: Not on file   Occupational History    Occupation: CT tech   Tobacco Use    Smoking status: Never    Smokeless tobacco: Never   Substance and Sexual Activity    Alcohol use: Yes     " "Alcohol/week: 2.0 standard drinks of alcohol     Types: 2 Standard drinks or equivalent per week    Drug use: Never    Sexual activity: Never   Other Topics Concern    Not on file   Social History Narrative    Not on file     Social Determinants of Health     Financial Resource Strain: Not on file   Food Insecurity: Not on file   Transportation Needs: Not on file   Physical Activity: Not on file   Stress: Not on file   Social Connections: Not on file   Intimate Partner Violence: Not on file   Housing Stability: Not on file       Current Outpatient Medications on File Prior to Visit   Medication Sig Dispense Refill    norethindrone (Micronor) 0.35 mg tablet Take 1 tablet (0.35 mg) by mouth once daily. 84 tablet 3    sertraline (Zoloft) 100 mg tablet Take 1 tablet (100 mg) by mouth once daily.      [DISCONTINUED] cefdinir (Omnicef) 300 mg capsule Take 1 capsule (300 mg) by mouth 2 times a day for 10 days. 20 capsule 0    [DISCONTINUED] norethindrone (Micronor) 0.35 mg tablet Take 1 tablet (0.35 mg) by mouth once daily.       No current facility-administered medications on file prior to visit.       No Known Allergies    Visit Vitals  /78   Pulse 92   Ht 1.676 m (5' 6\")   Wt 139 kg (306 lb)   SpO2 99%   BMI 49.39 kg/m²   OB Status Having periods   Smoking Status Never   BSA 2.54 m²     Labs reviewed       Physical Exam  Gen: Alert and oriented x3 female in no acute distress.  HEENT: Head is normocephalic.  Pupils equal and reactive to light.   Neck is supple without adenopathy or carotid bruits.  Heart: Regular rate and rhythm without murmurs.  Lungs: Clear to auscultation bilaterally.  Breast:         Deferred to Gyn  Pelvic:            Deferred to Gyn   Abdomen: Soft with normal bowel sounds.  No masses or pain to palpation.  No bruits auscultated.  Extremities: Good range of motion of all joints.  No significant edema. Pedal pulses +1-2/4  Neuro: No signs of focal neurologic deficit.  No tremor.  Speech and " hearing are normal.  DTRs +3/4;  Muscle Strength +5/5.  Musculoskeletal: Spine with good ROM.  Leg lengths are equal.  Skin: No significant or irregular nevi visualized.  Psych: normal affect.  No suicidal ideation.  Good judgment and insight.    Diagnosis/Plan:     1. Encounter for health maintenance examination in adult      2. Vitamin D deficiency  A prescription for vitamin D will be sent to your pharmacy.  Begin or continue a multivitamin.      3. END (generalized anxiety disorder)  Stable.   Continue current medication/treatment plan.     Discussed how to wean off of med.  Aware at any time if thoughts of suicide or homicide are present contact medical services immediately.    - sertraline (Zoloft) 100 mg tablet; Take 1 tablet (100 mg) by mouth once daily.  Dispense: 90 tablet; Refill: 1    4. Major depressive disorder with single episode, in full remission (CMS/[facility])  Stable.   Continue current medication/treatment plan.     Discussed how to wean off of med.  Aware at any time if thoughts of suicide or homicide are present contact medical services immediately.  - sertraline (Zoloft) 100 mg tablet; Take 1 tablet (100 mg) by mouth once daily.  Dispense: 90 tablet; Refill: 1    5. Morbid obesity with BMI of 45.0-49.9, adult (CMS/[facility])  Referred to Pharm  - Referral to Nutrition Services; Future    6. Elevated blood pressure reading in office without diagnosis of hypertension     Discussed importance of good blood pressure control to avoid long-term complications such as heart attack and stroke.  Patient is aware that blood pressure goal is less than 130/80.   Maintaining a regular exercise program and body mass index (BMI) less than 25 as well as a diet lower in carbohydrates will help reach these goals.   Info regarding the DASH diet:  https://www.nhlbi.nih.gov/health-topics/dash-eating-plan.  If you are monitoring your blood pressure at home, please bring your blood pressure cuff at least once a year so we can  complete comparative readings.  Stable.  Continue current medications.    - hydroCHLOROthiazide (HYDRODiuril) 25 mg tablet; Take 1 tablet (25 mg) by mouth once daily.  Dispense: 30 tablet; Refill: 11  - ECG 12 lead (Clinic Performed)      Medications refills will be completed as discussed.     Any labs or testing that is ordered will be reviewed and the results will be in your chart .   You can review these via  Conne[employer].     Follow up six months for medication management.     Prescriptions will not be filled unless you are compliant with your follow-up appointments or have a follow-up appointment scheduled as per the instruction of your provider. Refills for medications should be requested at the time of your office visit.     Please allow one week for refill requests to be completed.     Contact office with any questions or concerns.   Preferred communication is via  Connexity  Please contact [email or web address] if having issues with  Connexity    Chelly DURAN-[facility]  [address], [address]  [place] [address]  Phone: [phone]    **Charting was completed using voice recognition technology and may include unintended errors**

## 2023-12-13 LAB
CYTOLOGY CMNT CVX/VAG CYTO-IMP: NORMAL
HPV HR GENOTYPES PNL CVX NAA+PROBE: NEGATIVE
LAB AP HPV GENOTYPE QUESTION: NO
LAB AP HPV HR: NORMAL
LABORATORY COMMENT REPORT: NORMAL
PATH REPORT.TOTAL CANCER: NORMAL

## 2023-12-14 ENCOUNTER — TELEMEDICINE (OUTPATIENT)
Dept: PHARMACY | Facility: HOSPITAL | Age: 33
End: 2023-12-14
Payer: COMMERCIAL

## 2023-12-14 DIAGNOSIS — E66.1 CLASS 3 DRUG-INDUCED OBESITY WITHOUT SERIOUS COMORBIDITY WITH BODY MASS INDEX (BMI) OF 45.0 TO 49.9 IN ADULT (MULTI): Primary | ICD-10-CM

## 2023-12-14 DIAGNOSIS — E66.01 MORBID OBESITY WITH BMI OF 45.0-49.9, ADULT (MULTI): ICD-10-CM

## 2023-12-14 NOTE — PROGRESS NOTES
"[facility] Pharmacy Consult  Priscilla Lam is a 33 y.o. female was referred to Clinical Pharmacy Team for a Pharmacy consult.  The patient was referred for their Weight Loss.    Referring Provider: RENEE Goncalves-CNP    Subjective   No Known Allergies    [id number] - [place], [place] - [address]  [address] [place] [address]  Phone: [phone] Fax: [phone]      HPI    Patient is here for an appointment with the clinical pharmacy team in regards to weight loss. Pt reported that she has tried diet and exercise in the past, which she lost about 60lbs 5-6 years ago but has gained it all back. Current diet consist of anything and exercise is about 2-3 times a week, 30 mins cardio. Pt reported she does eat when she is bored and often finds herself eating a lot. Has not seen a nutritionist in the past or currently. Has not tried RX or OTC in the past for weight loss. Has not participated in weight watchers or any other weight loss program. Pt reported she wants to try Contrave for weight loss and will pay out of pocket. Denies any hx of seizures, eating disorder, chronic alcohol use, or chronic opioid use. Does have a hx of HTN and is currently taking Hydrochlorothiazide.     Review of Systems    Objective     There were no vitals taken for this visit.     LAB  Lab Results   Component Value Date    BILITOT 0.4 11/28/2023    CALCIUM 9.0 11/28/2023    CO2 27 11/28/2023     11/28/2023    CREATININE 0.81 11/28/2023    GLUCOSE 84 11/28/2023    ALKPHOS 35 11/28/2023    K 4.5 11/28/2023    PROT 7.3 11/28/2023     11/28/2023    AST 23 11/28/2023    ALT 27 11/28/2023    BUN 11 11/28/2023    ANIONGAP 15 11/28/2023    ALBUMIN 4.4 11/28/2023    LIPASE 18 11/16/2023     Lab Results   Component Value Date    TRIG 115 11/28/2023    CHOL 145 11/28/2023    LDLCALC 82 11/28/2023    HDL 39.8 11/28/2023     No results found for: \"HGBA1C\"    Current Outpatient Medications on File Prior to " Visit   Medication Sig Dispense Refill    ergocalciferol (Vitamin D-2) 1.25 MG (17305 UT) capsule Take 1 capsule (50,000 Units) by mouth 1 (one) time per week. 13 capsule 2    hydroCHLOROthiazide (HYDRODiuril) 25 mg tablet Take 1 tablet (25 mg) by mouth once daily. 30 tablet 11    norethindrone (Micronor) 0.35 mg tablet Take 1 tablet (0.35 mg) by mouth once daily. 84 tablet 3    sertraline (Zoloft) 100 mg tablet Take 1 tablet (100 mg) by mouth once daily. 90 tablet 1     No current facility-administered medications on file prior to visit.        HISTORICAL PHARMACOTHERAPY  -None    DRUG INTERACTIONS  - No drug interactions was found at this visit.      Assessment/Plan   Problem List Items Addressed This Visit       Class 3 drug-induced obesity with body mass index (BMI) of 45.0 to 49.9 in adult (CMS/[facility]) - Primary    Relevant Medications    naltrexone-bupropion (Contrave) 8-90 mg ER tablet    Morbid obesity with BMI of 45.0-49.9, adult (CMS/[facility])     Discussed in depth the risk and benefits of each medication for weight loss:  GLP-1: not covered by her insurance. Provides the most weight loss and the best result  Mounjaro $1200 out of pocket  Wegovy $1500 out of pocket  Qsymia (Phentermine/topiramate): not as effective as Glp1 but has shown benefits for weight loss. $800 out of pocket  Contrave (bupropion/naltrexone): has shown benefits for weight loss and benefits in pt who has hx of depression/anxiety. Out of pocket roughly $200  Metformin: minor data for weight loss benefits but has shown benefits in some patients. $5  Topiramate: alone not as effective for weight loss. $5  Pt wants to try Contrave out of pocket to reduce cravings and weight loss. Counseled on side effects of HTN as bupropion can increase blood pressure. HTN not severed uncontrolled, will monitor while on therapy. Counseled on irrregular heartbeats, chest pain, chest discomfort, uneasy feeling, headaches, etc. Pt to call and stop immediately  should she experience any side effects. No absolute contraindications such as seizures, alcohol abuse, chronic opioid use, and eating disorder. Pt educated on increase suicidal risk on use with SSRI.  Pt educated to combine medication with proper diet and exercise modifications: DASH diet, more vegetables, less eating out, more home cook meals, and at least 150 mins of exercise per week.  Prescription for Contrave 8-90mg ER tablet sent over to pt's preferred pharmacy;  Titration: 1 tablet by mouth once daily, then increase to 2 tablets daily after 1 week. Titrate each week to a max of 2 tablets BID, max tolerated dose or max affordable dose.  Pt aware of risk vs benefits. FUV in 2 week to discuss benefits, tolerance and titrations.             Continue all meds under the continuation of care with the referring provider and clinical pharmacy team.    Eugenio Bradshaw PharmD     Verbal consent to manage patient's drug therapy was obtained from [the patient and/or an individual authorized to act on behalf of a patient]. They were informed they may decline to participate or withdraw from participation in pharmacy services at any time.

## 2023-12-14 NOTE — ASSESSMENT & PLAN NOTE
Discussed in depth the risk and benefits of each medication for weight loss:  GLP-1: not covered by her insurance. Provides the most weight loss and the best result  Mounjaro $1200 out of pocket  Wegovy $1500 out of pocket  Qsymia (Phentermine/topiramate): not as effective as Glp1 but has shown benefits for weight loss. $800 out of pocket  Contrave (bupropion/naltrexone): has shown benefits for weight loss and benefits in pt who has hx of depression/anxiety. Out of pocket roughly $200  Metformin: minor data for weight loss benefits but has shown benefits in some patients. $5  Topiramate: alone not as effective for weight loss. $5  Pt wants to try Contrave out of pocket to reduce cravings and weight loss. Counseled on side effects of HTN as bupropion can increase blood pressure. HTN not severed uncontrolled, will monitor while on therapy. Counseled on irrregular heartbeats, chest pain, chest discomfort, uneasy feeling, headaches, etc. Pt to call and stop immediately should she experience any side effects. No absolute contraindications such as seizures, alcohol abuse, chronic opioid use, and eating disorder. Pt educated on increase suicidal risk on use with SSRI.  Pt educated to combine medication with proper diet and exercise modifications: DASH diet, more vegetables, less eating out, more home cook meals, and at least 150 mins of exercise per week.  Prescription for Contrave 8-90mg ER tablet sent over to pt's preferred pharmacy;  Titration: 1 tablet by mouth once daily, then increase to 2 tablets daily after 1 week. Titrate each week to a max of 2 tablets BID, max tolerated dose or max affordable dose.  Pt aware of risk vs benefits. FUV in 2 week to discuss benefits, tolerance and titrations.

## 2023-12-18 ENCOUNTER — APPOINTMENT (OUTPATIENT)
Dept: NUTRITION | Facility: CLINIC | Age: 33
End: 2023-12-18
Payer: COMMERCIAL

## 2023-12-20 ENCOUNTER — APPOINTMENT (OUTPATIENT)
Dept: UROLOGY | Facility: CLINIC | Age: 33
End: 2023-12-20
Payer: COMMERCIAL

## 2023-12-28 ENCOUNTER — TELEMEDICINE (OUTPATIENT)
Dept: PHARMACY | Facility: HOSPITAL | Age: 33
End: 2023-12-28
Payer: COMMERCIAL

## 2023-12-28 DIAGNOSIS — E66.01 MORBID OBESITY WITH BMI OF 45.0-49.9, ADULT (MULTI): Primary | ICD-10-CM

## 2023-12-28 NOTE — ASSESSMENT & PLAN NOTE
INCREASE Contrave titration to 2 tabs AM and 1 PM next week x 7 days, then increase to max dose 2 tabs BID as tolerated.   Educated on side effects of dose titration.  FUV in 2 months to assess efficacy and tolerance.

## 2023-12-28 NOTE — PROGRESS NOTES
"[facility] Pharmacy Consult  Priscilla Lam is a 33 y.o. female was referred to Clinical Pharmacy Team for a Pharmacy consult.  The patient was referred for their Weight Loss.    Referring Provider: RENEE Goncalves-CNP    Subjective   No Known Allergies    [id number] - [place], [place] - [address]  [address] [place] [address]  Phone: [phone] Fax: [phone]    [facility] - [place], [place] - [address]  [address] [facility] [address] [facility] [address]  Phone: [phone] Fax: [phone]      HPI    Patient is here for a follow up visit with the clinical ambulatory team in regards to her weight loss. Recently started on Contrave for weight loss. Pe reported she has received her supply from the pharmacy and has started taking  it 2 weeks ago. Reported experience some suppression of appetite and denies any side effects or concerns. Pt has increased to 2 tabs daily now.    Review of Systems    Objective     There were no vitals taken for this visit.     LAB  Lab Results   Component Value Date    BILITOT 0.4 11/28/2023    CALCIUM 9.0 11/28/2023    CO2 27 11/28/2023     11/28/2023    CREATININE 0.81 11/28/2023    GLUCOSE 84 11/28/2023    ALKPHOS 35 11/28/2023    K 4.5 11/28/2023    PROT 7.3 11/28/2023     11/28/2023    AST 23 11/28/2023    ALT 27 11/28/2023    BUN 11 11/28/2023    ANIONGAP 15 11/28/2023    ALBUMIN 4.4 11/28/2023    LIPASE 18 11/16/2023     Lab Results   Component Value Date    TRIG 115 11/28/2023    CHOL 145 11/28/2023    LDLCALC 82 11/28/2023    HDL 39.8 11/28/2023     No results found for: \"HGBA1C\"    Current Outpatient Medications on File Prior to Visit   Medication Sig Dispense Refill    ergocalciferol (Vitamin D-2) 1.25 MG (61445 UT) capsule Take 1 capsule (50,000 Units) by mouth 1 (one) time per week. 13 capsule 2    hydroCHLOROthiazide (HYDRODiuril) 25 mg tablet Take 1 tablet (25 mg) by mouth once daily. 30 tablet 11    " naltrexone-bupropion (Contrave) 8-90 mg ER tablet Take 1 tablet by mouth once daily for 1 week, then increase to 2 tablets daily for 1 week as tolerated. Reassess after 2 week for titration. 120 tablet 2    norethindrone (Micronor) 0.35 mg tablet Take 1 tablet (0.35 mg) by mouth once daily. 84 tablet 3    sertraline (Zoloft) 100 mg tablet Take 1 tablet (100 mg) by mouth once daily. 90 tablet 1     No current facility-administered medications on file prior to visit.        DRUG INTERACTIONS  - No drug interactions were found at this visit.    Assessment/Plan   Problem List Items Addressed This Visit       Morbid obesity with BMI of 45.0-49.9, adult (CMS/[facility]) - Primary     INCREASE Contrave titration to 2 tabs AM and 1 PM next week x 7 days, then increase to max dose 2 tabs BID as tolerated.   Educated on side effects of dose titration.  FUV in 2 months to assess efficacy and tolerance.             Continue all meds under the continuation of care with the referring provider and clinical pharmacy team.    Eugenio Bradshaw PharmD     Verbal consent to manage patient's drug therapy was obtained from [the patient and/or an individual authorized to act on behalf of a patient]. They were informed they may decline to participate or withdraw from participation in pharmacy services at any time.

## 2024-01-02 ENCOUNTER — APPOINTMENT (OUTPATIENT)
Dept: UROLOGY | Facility: HOSPITAL | Age: 34
End: 2024-01-02
Payer: COMMERCIAL

## 2024-01-04 ENCOUNTER — APPOINTMENT (OUTPATIENT)
Dept: UROLOGY | Facility: CLINIC | Age: 34
End: 2024-01-04
Payer: COMMERCIAL

## 2024-01-04 ENCOUNTER — NUTRITION (OUTPATIENT)
Dept: PRIMARY CARE | Facility: CLINIC | Age: 34
End: 2024-01-04
Payer: COMMERCIAL

## 2024-01-04 VITALS — WEIGHT: 293 LBS | BODY MASS INDEX: 47.09 KG/M2 | HEIGHT: 66 IN

## 2024-01-04 DIAGNOSIS — E66.01 MORBID OBESITY WITH BMI OF 45.0-49.9, ADULT (MULTI): Primary | ICD-10-CM

## 2024-01-04 PROCEDURE — 97802 MEDICAL NUTRITION INDIV IN: CPT | Performed by: DIETITIAN, REGISTERED

## 2024-01-04 NOTE — PROGRESS NOTES
"Nutrition Assessment     Reason for Visit:  Priscilla Lam is a 33 y.o. 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 She wants to talk about diet to promote weight loss. She is seeking creating habits that are sustainable, making more food herself but also keeping meals simple.  Food Allergy: none     Food Intake  Meal 1: wakes up 5:40 a.m., no breakfast, sometimes a protein shake  Meal 2: 11-12, cafeteria salad, soup or pizza from [employer]. Doesn't get a set amount of time each work shift to be able to eat.  Meal 3: finishes work 3:30, arrives home about 4. D: eats a full dinner meal about 2 days per week, she snacks instead of eating a dinner meal most days of the week    Food Preparation  Cooking: Patient (prefers to make simple meals, has a small kitchen in an apartment)  Grocery Shopping: Patient (dislikes grocery shopping)  Dining Out: 1 to 3 times a month (no fast food)    Micronutrient Intake  Vitamin Intake: D, C, Multivitamin  Mineral/Element Intake: Magnesium    Food And Nutrient Administration:  Diet Experience  Self-selected diet(s) followed: followed a no dairy, no sugar, low carbohydrate diet about 5-6 years ago and exercised a lot at that time to facilitate weight loss    Physical Activities:  Physical Activity  Physical Activity History: In the past she had a period where she was exercising 30 minutes at a bootcamp at a gym. She moved and stopped going because the gym was a greater distance from her home.  Type of Physical Activity: works out 2-3 days per week, exercise " bike x 30 minutes. Is on her feet at work.    Nutrition Diagnosis   Malnutrition Diagnosis  Patient has Malnutrition Diagnosis: No  Nutrition Diagnosis  Patient has Nutrition Diagnosis: Yes  Diagnosis Status (1): New  Nutrition Diagnosis 1: Inconsistent carbohydrate intake (and overall inconsistent intake of food)  Related to (1): work schedule doesn't support a consistent lunch break, she doesn't currently have a practice of prepping food ahead of time to make it readily available for meals  As Evidenced by (1): she reports inconsistently eating in the morning, usually eating food in the middle of the day but the composition of lunch is variable, and eating an evening meal/snacks that are of variable composition    Nutrition Interventions/Recommendations   Nutrition Prescription  Individualized Nutrition Prescription Provided for : Reduced calorie diet to promote weight loss  Food and Nutrition Delivery  Meals & Snacks: Modify Composition of Meals/Snacks, Modify schedule of foods/fluids    Patient Instructions   Discussed changes she is interested in making at each meal:  Breakfast: eggs or egg bites + banana.   Lunch: she is going to initially focus her attention on packing lunch so she has a balanced option to bring to work. Talked with her about simple meals to prepare or purchase ahead of time so she doesn't need to stop in the cafeteria to buy lunch. Talked about options she can purchase in the cafeteria as well.   Dinner: eventually she will focus on more of a structured dinner, but her initial goal will be lunch. She may use some packaged items to help with dinners such as frozen meals or convenience items. Discussed making dinner look more like a balanced meal instead of snacks.     2. Utilize the Plate Method at meals in order to balance meals.   - 1/3-1/2 of the plate full of non-starchy vegetables. These foods contribute very little carbohydrate to the plate, and they add fiber to the meal. Salad,  carrots, bell peppers, zucchini, broccoli, cauliflower, asparagus, radishes, carrots and green beans are a few examples of these foods. They can be served cooked or raw.    - 1/4-1/3 of the plate including protein foods. Examples can include meat, nuts, seeds, cheese, cottage cheese, nut butter, fish, seafood, tofu, and edamame.     - 1/4-1/3 of the plate including carbohydrate foods. These foods include grains, fruit, legumes, starchy vegetables, milk and yogurt. Aim to eat at least half of the daily grains as whole grains.    3. Talked about the Cycle of Dieting and that trying another overly-restrictive diet is not a good idea. Discouraged elimination of a food group (such as dairy) or type of food (carbohydrates). Instead, we talked about how the Plate Method can promote satisfaction and calorie control because it will give her a reliable amount of a variety of foods. Encouraged gradually working up towards 3 balanced meals/day, it is ok if she initially just wants to focus on making changes at lunch.     Nutrition Monitoring and Evaluation   Food/Nutrient Related History Monitoring  Monitoring and Evaluation Plan: Amount of food, Meal/snack pattern  Amount of Food: Estimated amout of food, Types of food  Criteria: She will use the Plate Method to guide food choices at meals  Meal/Snack Pattern: Estimated meal and snack pattern  Criteria: She will aim to eat at least 3 times during the day, not skipping meals. If meals aren't possible she will aim to consume a snack or two, or even a meal replacement such as a bar or shake  Knowledge/Beliefs/Attitudes  Monitoring and Evaluation Plan: Physical activity  Physical activity: Consistency  Criteria: She will continue to be active at least 2 days per week and in the future can turn her attention to changing/growing exercise habit     Readiness to Change : Good  Level of Understanding : Good  Anticipated Compliant : Good    Follow up: Discussed making a follow up  appointment, she might be interested in seeing one of the dietitians at [facility] instead since she wouldn't have to drive to the appointment. She will call to schedule.

## 2024-01-05 NOTE — PATIENT INSTRUCTIONS
Discussed changes she is interested in making at each meal:  Breakfast: discussed that although changes at lunch will be her top priority initially, she will eventually start eating food in the morning as well. Talked about consistently including protein with breakfast.   Lunch: she is going to initially focus her attention on packing lunch so she has a balanced option to bring to work. Talked with her about simple meals to prepare or purchase ahead of time so she doesn't need to stop in the cafeteria to buy lunch. Talked about options she can purchase in the cafeteria as well.   Dinner: eventually she will focus on more of a structured dinner, but her initial goal will be lunch. She may use some packaged items to help with dinners such as frozen meals or convenience items. Discussed making dinner look more like a balanced meal instead of snacks.     2. Utilize the Plate Method at meals in order to balance meals.   - 1/3-1/2 of the plate full of non-starchy vegetables. These foods contribute very little carbohydrate to the plate, and they add fiber to the meal. Salad, carrots, bell peppers, zucchini, broccoli, cauliflower, asparagus, radishes, carrots and green beans are a few examples of these foods. They can be served cooked or raw.    - 1/4-1/3 of the plate including protein foods. Examples can include meat, nuts, seeds, cheese, cottage cheese, nut butter, fish, seafood, tofu, and edamame.     - 1/4-1/3 of the plate including carbohydrate foods. These foods include grains, fruit, legumes, starchy vegetables, milk and yogurt. Aim to eat at least half of the daily grains as whole grains.    3. Talked about the Cycle of Dieting and that trying another overly-restrictive diet is not a good idea. Discouraged elimination of a food group (such as dairy) or type of food (carbohydrates). Instead, we talked about how the Plate Method can promote satisfaction and calorie control because it will give her a reliable amount of  a variety of foods. Encouraged gradually working up towards 3 balanced meals/day, it is ok if she initially just wants to focus on making changes at lunch.

## 2024-02-22 ENCOUNTER — TELEMEDICINE (OUTPATIENT)
Dept: PHARMACY | Facility: HOSPITAL | Age: 34
End: 2024-02-22
Payer: COMMERCIAL

## 2024-02-22 DIAGNOSIS — E66.01 MORBID OBESITY WITH BMI OF 45.0-49.9, ADULT (MULTI): Primary | ICD-10-CM

## 2024-02-22 NOTE — ASSESSMENT & PLAN NOTE
CONTINUE all medications as prescribed  Pt shown a 8.5% weight reduction in 3 months, will continue medication  Refill sent for CONTRAVE 8-90 mg ER Tablet; take 2 tabs BID  FUV in 6 months

## 2024-02-22 NOTE — PROGRESS NOTES
"[facility] Pharmacy Consult  Priscilla Lam is a 33 y.o. female was referred to Clinical Pharmacy Team for a Pharmacy consult.  The patient was referred for their Weight Loss.    Referring Provider: RENEE Goncalves-CNP    Subjective   No Known Allergies    [id number] - [place], [place] - [address]  [address] [place] [address]  Phone: [phone] Fax: [phone]    [facility] - [place] [id number]  [address] [address]  Phone: [phone] Fax: [phone]      HPI    Patient is here for a visit with the clinical ambulatory team in regards to her weight loss management. Recently started Contrave for weight loss. Reported some dizziness at the beginning but no longer having any issue. Feeling a lot more energy. Lost 20 lbs. Noticed not eating as much.     Weight:  Current weight 280 lbs  Baseline weight 306 lbs.   8.5% Weight reduction in 3 months    Review of Systems    Objective     There were no vitals taken for this visit.     LAB  Lab Results   Component Value Date    BILITOT 0.4 11/28/2023    CALCIUM 9.0 11/28/2023    CO2 27 11/28/2023     11/28/2023    CREATININE 0.81 11/28/2023    GLUCOSE 84 11/28/2023    ALKPHOS 35 11/28/2023    K 4.5 11/28/2023    PROT 7.3 11/28/2023     11/28/2023    AST 23 11/28/2023    ALT 27 11/28/2023    BUN 11 11/28/2023    ANIONGAP 15 11/28/2023    ALBUMIN 4.4 11/28/2023    LIPASE 18 11/16/2023     Lab Results   Component Value Date    TRIG 115 11/28/2023    CHOL 145 11/28/2023    LDLCALC 82 11/28/2023    HDL 39.8 11/28/2023     No results found for: \"HGBA1C\"    Current Outpatient Medications on File Prior to Visit   Medication Sig Dispense Refill    ergocalciferol (Vitamin D-2) 1.25 MG (22344 UT) capsule Take 1 capsule (50,000 Units) by mouth 1 (one) time per week. 13 capsule 2    hydroCHLOROthiazide (HYDRODiuril) 25 mg tablet Take 1 tablet (25 mg) by mouth once daily. 30 tablet 11    " norethindrone (Micronor) 0.35 mg tablet Take 1 tablet (0.35 mg) by mouth once daily. 84 tablet 3    sertraline (Zoloft) 100 mg tablet Take 1 tablet (100 mg) by mouth once daily. 90 tablet 1    [DISCONTINUED] naltrexone-bupropion (Contrave) 8-90 mg ER tablet Take 1 tablet by mouth once daily for 1 week, then increase to 2 tablets daily for 1 week as tolerated. Reassess after 2 week for titration. 120 tablet 2     No current facility-administered medications on file prior to visit.        DRUG INTERACTIONS  - No drug interactions was found at this visit      Assessment/Plan   Problem List Items Addressed This Visit       Morbid obesity with BMI of 45.0-49.9, adult (CMS/[facility]) - Primary     CONTINUE all medications as prescribed  Pt shown a 8.5% weight reduction in 3 months, will continue medication  Refill sent for CONTRAVE 8-90 mg ER Tablet; take 2 tabs BID  FUV in 6 months         Relevant Medications    naltrexone-bupropion (Contrave) 8-90 mg ER tablet        Continue all meds under the continuation of care with the referring provider and clinical pharmacy team.    Eugenio Bradshaw PharmD     Verbal consent to manage patient's drug therapy was obtained from [the patient and/or an individual authorized to act on behalf of a patient]. They were informed they may decline to participate or withdraw from participation in pharmacy services at any time.

## 2024-06-04 ENCOUNTER — OFFICE VISIT (OUTPATIENT)
Dept: PRIMARY CARE | Facility: CLINIC | Age: 34
End: 2024-06-04
Payer: COMMERCIAL

## 2024-06-04 VITALS
BODY MASS INDEX: 45.8 KG/M2 | DIASTOLIC BLOOD PRESSURE: 84 MMHG | HEART RATE: 96 BPM | WEIGHT: 285 LBS | SYSTOLIC BLOOD PRESSURE: 124 MMHG | HEIGHT: 66 IN | OXYGEN SATURATION: 99 %

## 2024-06-04 DIAGNOSIS — E66.01 MORBID OBESITY WITH BMI OF 45.0-49.9, ADULT (MULTI): ICD-10-CM

## 2024-06-04 DIAGNOSIS — Z11.59 NEED FOR HEPATITIS C SCREENING TEST: ICD-10-CM

## 2024-06-04 DIAGNOSIS — I10 HYPERTENSION, UNSPECIFIED TYPE: Primary | ICD-10-CM

## 2024-06-04 DIAGNOSIS — R03.0 ELEVATED BLOOD PRESSURE READING IN OFFICE WITHOUT DIAGNOSIS OF HYPERTENSION: ICD-10-CM

## 2024-06-04 DIAGNOSIS — F41.1 GAD (GENERALIZED ANXIETY DISORDER): ICD-10-CM

## 2024-06-04 DIAGNOSIS — F32.5 MAJOR DEPRESSIVE DISORDER WITH SINGLE EPISODE, IN FULL REMISSION (CMS-HCC): ICD-10-CM

## 2024-06-04 PROCEDURE — 1036F TOBACCO NON-USER: CPT | Performed by: NURSE PRACTITIONER

## 2024-06-04 PROCEDURE — 3074F SYST BP LT 130 MM HG: CPT | Performed by: NURSE PRACTITIONER

## 2024-06-04 PROCEDURE — 99214 OFFICE O/P EST MOD 30 MIN: CPT | Performed by: NURSE PRACTITIONER

## 2024-06-04 PROCEDURE — 3008F BODY MASS INDEX DOCD: CPT | Performed by: NURSE PRACTITIONER

## 2024-06-04 PROCEDURE — 3079F DIAST BP 80-89 MM HG: CPT | Performed by: NURSE PRACTITIONER

## 2024-06-04 RX ORDER — HYDROCHLOROTHIAZIDE 25 MG/1
25 TABLET ORAL DAILY
Qty: 90 TABLET | Refills: 1 | Status: SHIPPED | OUTPATIENT
Start: 2024-06-04 | End: 2024-06-04 | Stop reason: SDUPTHER

## 2024-06-04 RX ORDER — SERTRALINE HYDROCHLORIDE 100 MG/1
100 TABLET, FILM COATED ORAL DAILY
Qty: 90 TABLET | Refills: 1 | Status: SHIPPED | OUTPATIENT
Start: 2024-06-04

## 2024-06-04 RX ORDER — HYDROCHLOROTHIAZIDE 25 MG/1
25 TABLET ORAL DAILY
Qty: 90 TABLET | Refills: 1 | Status: SHIPPED | OUTPATIENT
Start: 2024-06-04

## 2024-06-04 ASSESSMENT — PATIENT HEALTH QUESTIONNAIRE - PHQ9
1. LITTLE INTEREST OR PLEASURE IN DOING THINGS: NOT AT ALL
SUM OF ALL RESPONSES TO PHQ9 QUESTIONS 1 & 2: 0
2. FEELING DOWN, DEPRESSED OR HOPELESS: NOT AT ALL

## 2024-06-04 NOTE — PROGRESS NOTES
Med Management     33 y.o. female presents for med management    Recent hospitalizations, surgeries or ER visits: denies     Diet:  healthy        Water per day:  60-90 ounces  Exercise: 2-3 times per week   Alcohol: 2 per week   Tobacco: denies   Pap/Pelvic: 11/28/23 Aida Stevens RENEE     Allowed to report any questions or concerns.    Morbid obesity: Eugenio Bradshaw Pharm D   Med: Contrave   Lost 21 lbs since 11/23    Anxiety and depression:  Med: Zoloft   Feels stable on current dose.    Denies thoughts of suicide or homicide.    Hypertension:   Med: HCTZ   Blood pressures at home: 1 time per week   Denies chest pain, palpitations, headaches, dizziness.    Vit D def:   OTC Vit D     Past Medical History:   Diagnosis Date    Kidney stones     Major depressive disorder, single episode, mild (CMS-HCC) 01/29/2020    Major depressive disorder, single episode, mild degree    Other general symptoms and signs 02/16/2018    Flu-like symptoms    Personal history of other diseases of the nervous system and sense organs     History of myopia    Personal history of other specified conditions 02/16/2018    History of fever    Pharyngitis 11/16/2023      Past Surgical History:   Procedure Laterality Date    OTHER SURGICAL HISTORY  09/19/2019    [place] tooth extraction    WISDOM TOOTH EXTRACTION       Family History   Problem Relation Name Age of Onset    Hypertension Mother Lawanda Lam     Blood clot Father Emilio Lam     Hypertension Maternal Grandmother Ivory Beasley     Pancreatic cancer Maternal Grandmother Ivory Beasley       Social History     Socioeconomic History    Marital status: Single     Spouse name: Not on file    Number of children: Not on file    Years of education: Not on file    Highest education level: Not on file   Occupational History    Occupation: [employer]   Tobacco Use    Smoking status: Never    Smokeless tobacco: Never   Substance and Sexual Activity    Alcohol use: Yes     Alcohol/week: 2.0 standard drinks of  "alcohol     Types: 2 Standard drinks or equivalent per week    Drug use: Never    Sexual activity: Never   Other Topics Concern    Not on file   Social History Narrative    Not on file     Social Determinants of Health     Financial Resource Strain: Not on file   Food Insecurity: Not on file   Transportation Needs: Not on file   Physical Activity: Not on file   Stress: Not on file   Social Connections: Not on file   Intimate Partner Violence: Not on file   Housing Stability: Not on file       Current Outpatient Medications on File Prior to Visit   Medication Sig Dispense Refill    ergocalciferol (Vitamin D-2) 1.25 MG (55779 UT) capsule Take 1 capsule (50,000 Units) by mouth 1 (one) time per week. 13 capsule 2    hydroCHLOROthiazide (HYDRODiuril) 25 mg tablet Take 1 tablet (25 mg) by mouth once daily. 30 tablet 11    naltrexone-bupropion (Contrave) 8-90 mg ER tablet Take 2 tablets by mouth 2 times a day. 120 tablet 5    norethindrone (Micronor) 0.35 mg tablet Take 1 tablet (0.35 mg) by mouth once daily. 84 tablet 3    sertraline (Zoloft) 100 mg tablet Take 1 tablet (100 mg) by mouth once daily. 90 tablet 1     No current facility-administered medications on file prior to visit.       No Known Allergies        Visit Vitals  OB Status Having periods   Smoking Status Never      Visit Vitals  /84   Pulse 96   Ht 1.676 m (5' 6\")   Wt 129 kg (285 lb)   SpO2 99%   BMI 46.00 kg/m²   OB Status Having periods   Smoking Status Never   BSA 2.45 m²       Physical Exam    Alert and oriented x 3  Neck supple without carotid bruit   Heart regular rate and rhythm without murmur  Lungs clear to auscultation.  Legs without edema.  Gait is non-antalgic  Speech clear.  Hearing adequate.  Psych: Normal affect. Good judgment and insight.     Diagnosis/Plan:     1. Need for hepatitis C screening test  - Hepatitis C antibody; Future    2. NED (generalized anxiety disorder)  Stable.   Continue current medication/treatment plan.   "   Aware at any time if thoughts of suicide or homicide are present contact medical services immediately.    - sertraline (Zoloft) 100 mg tablet; Take 1 tablet (100 mg) by mouth once daily.  Dispense: 90 tablet; Refill: 1    3. Major depressive disorder with single episode, in full remission (CMS-HCC)  Stable.   Continue current medication/treatment plan.     Aware at any time if thoughts of suicide or homicide are present contact medical services immediately.    - sertraline (Zoloft) 100 mg tablet; Take 1 tablet (100 mg) by mouth once daily.  Dispense: 90 tablet; Refill: 1    4. Hypertension, unspecified type   Discussed importance of good blood pressure control to avoid long-term complications such as heart attack and stroke.  Patient is aware that blood pressure goal is less than 130/80.   Maintaining a regular exercise program and body mass index (BMI) less than 25 as well as a diet lower in carbohydrates will help reach these goals.   If you are monitoring your blood pressure at home, please bring your blood pressure cuff at least once a year so we can complete comparative readings.  Stable.  Continue current medications.    - hydroCHLOROthiazide (HYDRODiuril) 25 mg tablet; Take 1 tablet (25 mg) by mouth once daily.  Dispense: 90 tablet; Refill: 1    5. Morbid obesity with BMI of 45.0-49.9, adult (Multi)  Continue to be seen by Pharm D  Maris Azulave   - Comprehensive metabolic panel; Future        Medications refills will be completed as discussed.     Any labs or testing that is ordered will be reviewed and the results will be in your chart .   You can review these via  [employer].     Follow up six months for  CPE     Prescriptions will not be filled unless you are compliant with your follow-up appointments or have a follow-up appointment scheduled as per the instruction of your provider. Refills for medications should be requested at the time of your office visit.     Please allow one week for refill  requests to be completed.     [facility] can help with scheduling referrals: [phone]   Mammogram [phone]  Physical Therapy [phone]    Contact office with any questions or concerns.   Preferred communication is via  [employer]  Please contact [email or web address] if having issues with  [email or web address]    Chelly Wong [facility]  [address] [address], [address]  [place] [address]  Phone: [phone]    **Charting was completed using voice recognition technology and may include unintended errors**

## 2024-08-08 ENCOUNTER — APPOINTMENT (OUTPATIENT)
Dept: PHARMACY | Facility: HOSPITAL | Age: 34
End: 2024-08-08
Payer: COMMERCIAL

## 2024-08-08 DIAGNOSIS — E66.01 MORBID OBESITY WITH BMI OF 45.0-49.9, ADULT (MULTI): ICD-10-CM

## 2024-08-08 NOTE — PROGRESS NOTES
"[facility] Pharmacist Visit - Weight Management  Priscilla Lam is a 33 y.o. female was referred to Clinical Pharmacy Team for Obesity.    Referring Provider: Chelly Wong APRN-*  PCP: RENEE Goncalves-CNP - last visit: 6/4/24    Subjective   HPI  PMH significant for none.  Special needs/barriers to therapy: none    WEIGHT MANAGEMENT  BMI Readings from Last 1 Encounters:   06/04/24 46.00 kg/m²   Starting weight: 306 lbs (139 kg)  Current weight: 280 lbs    Lab Results   Component Value Date    GLUCOSE 84 11/28/2023   Hyperglycemia:  none    Pharmacological therapy  Current Medications: Contrave   Previous Medications: none     Adherence: Takes medication as directed and reports no missed doses  Adverse Effects: none    Insurance coverage of weight-loss medications? No,    Eligible for copay cards/programs? No,      Medication Patient Risks/Cautions Notes   Wegovy (semaglutide) None    Zepbound (tirzepatide) None    Saxenda (liraglutide) None Current backorder of starting doses   Qsymia (phentermine-topiramate) None Controlled substance   Contrave (bupropion-naltrexone) None    Adipex (phentermine) None Controlled substance,  Short-term use only   Viktor/Xenical (orlistat)  Adverse effects likely outweigh benefit.         Secondary Prevention  ASCVD Risk:   The ASCVD Risk score (Mariaa DK, et al., 2019) failed to calculate for the following reasons:    The 2019 ASCVD risk score is only valid for ages 40 to 79  Objective   Vitals  BP Readings from Last 2 Encounters:   06/04/24 124/84   12/04/23 138/78     BMI Readings from Last 1 Encounters:   06/04/24 46.00 kg/m²      Labs  A1C  No results found for: \"HGBA1C\"  BMP  Lab Results   Component Value Date    CALCIUM 9.0 11/28/2023     11/28/2023    K 4.5 11/28/2023    CO2 27 11/28/2023     11/28/2023    BUN 11 11/28/2023    CREATININE 0.81 11/28/2023    EGFR >90 11/28/2023     LFTs  Lab Results   Component Value Date    ALT 27 11/28/2023    AST 23 11/28/2023 "    ALKPHOS 35 11/28/2023    BILITOT 0.4 11/28/2023     FLP  Lab Results   Component Value Date    TRIG 115 11/28/2023    CHOL 145 11/28/2023    LDLCALC 82 11/28/2023    HDL 39.8 11/28/2023       Assessment/Plan   Problem List Items Addressed This Visit    None      Weight Management: Current regimen includes CONTRAVE.. Patient's current weight reported as 280 lbs. Has lost 26 lbs (8.5% of TBW) since starting therapy plan. Due to success, plan to continue.  Continue taking CONTRAVE  Continue to focus on lifestyle modifications as discussed.    Patient Education:  Counseled patient on relevant MOA, expectations, side effects, duration of therapy, contraindications, administration, and monitoring parameters.  All questions and concerns addressed. Contact pharmacist with any further questions or concerns prior to next appointment.  Reviewed dietary recommendations: Healthy Plate method    Clinical Pharmacist follow-up: prn, Telehealth visit    Eugenio Bradshaw PharmD  Clinical Pharmacist  08/08/24    Continue all meds under the continuation of care with the referring provider and clinical pharmacy team.  Verbal consent to manage patient's drug therapy was obtained from the patient. They were informed they may decline to participate or withdraw from participation in pharmacy services at any time.

## 2024-12-08 NOTE — PROGRESS NOTES
"Rescheduled          Annual Comprehensive Medical Exam:    34 y.o. female presents for annual comprehensive medical evaluation and preventive services screening.      Recent hospitalizations, surgeries or ER visits: denies     Diet:   mix of healthy and unhealthy  Caffeine per day: 24 ounces  Water per day: 60-90 ounces  Exercise: \"a little bit\"  Alcohol: 2 per week  Tobacco: denies  Pap/Pelvic: 11/28/23 Aida Stevens CNP    Allowed to report any questions or concerns.    Anxiety and Depression:  Med: Zoloft  Feels stable on current dose.    Denies thoughts of suicide or homicide.    Hypertension:   Med: HCTZ   Blood pressures at home:   Tolerating without side effects     Morbid obesity: Eugenio Bradshaw Pharm D  Med: Contrave      Birth control: Gyn  Aida Stevens CNP      Past Medical History:   Diagnosis Date    Kidney stones     Major depressive disorder, single episode, mild (CMS-HCC) 01/29/2020    Major depressive disorder, single episode, mild degree    Other general symptoms and signs 02/16/2018    Flu-like symptoms    Personal history of other diseases of the nervous system and sense organs     History of myopia    Personal history of other specified conditions 02/16/2018    History of fever    Pharyngitis 11/16/2023      Past Surgical History:   Procedure Laterality Date    OTHER SURGICAL HISTORY  09/19/2019    [place] tooth extraction    WISDOM TOOTH EXTRACTION       Family History   Problem Relation Name Age of Onset    Hypertension Mother Lawanda Lam     Blood clot Father Emilio Lam     Hypertension Maternal Grandmother Ivory Beasley     Pancreatic cancer Maternal Grandmother Ivory Beasley       Social History     Socioeconomic History    Marital status: Single     Spouse name: Not on file    Number of children: Not on file    Years of education: Not on file    Highest education level: Not on file   Occupational History    Occupation: [employer] tech   Tobacco Use    Smoking status: Never    Smokeless tobacco: Never "   Substance and Sexual Activity    Alcohol use: Yes     Alcohol/week: 2.0 standard drinks of alcohol     Types: 2 Standard drinks or equivalent per week    Drug use: Never    Sexual activity: Never   Other Topics Concern    Not on file   Social History Narrative    Not on file     Social Drivers of Health     Financial Resource Strain: Not on file   Food Insecurity: Not on file   Transportation Needs: Not on file   Physical Activity: Not on file   Stress: Not on file   Social Connections: Not on file   Intimate Partner Violence: Not on file   Housing Stability: Not on file       Current Outpatient Medications on File Prior to Visit   Medication Sig Dispense Refill    cholecalciferol, vitamin D3, (VITAMIN D3 ORAL) Take by mouth.      hydroCHLOROthiazide (HYDRODiuril) 25 mg tablet Take 1 tablet (25 mg) by mouth once daily. 90 tablet 1    naltrexone-bupropion (Contrave) 8-90 mg ER tablet Take 2 tablets by mouth 2 times a day. 120 tablet 11    norethindrone (Micronor) 0.35 mg tablet Take 1 tablet (0.35 mg) by mouth once daily. 84 tablet 3    sertraline (Zoloft) 100 mg tablet Take 1 tablet (100 mg) by mouth once daily. 90 tablet 1     No current facility-administered medications on file prior to visit.       No Known Allergies    Visit Vitals  OB Status Having periods   Smoking Status Never     Labs reviewed       Physical Exam  Gen: Alert and oriented x3 female in no acute distress.  HEENT: Head is normocephalic.  Pupils equal and reactive to light.   Neck is supple without adenopathy or carotid bruits.  Heart: Regular rate and rhythm without murmurs.  Lungs: Clear to auscultation bilaterally.  Breast:         Deferred to Gyn  Pelvic:            Deferred to Gyn   Abdomen: Soft with normal bowel sounds.  No masses or pain to palpation.  No bruits auscultated.  Extremities: Good range of motion of all joints.  No significant edema. Pedal pulses +1-2/4  Neuro: No signs of focal neurologic deficit.  No tremor.  Speech and  hearing are normal.  DTRs +3/4;  Muscle Strength +5/5.  Musculoskeletal: Spine with good ROM.  Leg lengths are equal.  Skin: No significant or irregular nevi visualized.  Psych: normal affect.  No suicidal ideation.  Good judgment and insight.    Diagnosis/Plan:     1. Encounter for health maintenance examination in adult      2. Vitamin D deficiency  A prescription for vitamin D will be sent to your pharmacy.  Begin or continue a multivitamin.      3. NED (generalized anxiety disorder)  Stable.   Continue current medication/treatment plan.     Discussed how to wean off of med.  Aware at any time if thoughts of suicide or homicide are present contact medical services immediately.    - sertraline (Zoloft) 100 mg tablet; Take 1 tablet (100 mg) by mouth once daily.  Dispense: 90 tablet; Refill: 1    4. Major depressive disorder with single episode, in full remission (CMS/[facility])  Stable.   Continue current medication/treatment plan.     Discussed how to wean off of med.  Aware at any time if thoughts of suicide or homicide are present contact medical services immediately.  - sertraline (Zoloft) 100 mg tablet; Take 1 tablet (100 mg) by mouth once daily.  Dispense: 90 tablet; Refill: 1    5. Morbid obesity with BMI of 45.0-49.9, adult (CMS/[facility])  Referred to Pharm  - Referral to Nutrition Services; Future    6. Elevated blood pressure reading in office without diagnosis of hypertension     Discussed importance of good blood pressure control to avoid long-term complications such as heart attack and stroke.  Patient is aware that blood pressure goal is less than 130/80.   Maintaining a regular exercise program and body mass index (BMI) less than 25 as well as a diet lower in carbohydrates will help reach these goals.   Info regarding the DASH diet:  https://www.nhlbi.nih.gov/health-topics/dash-eating-plan.  If you are monitoring your blood pressure at home, please bring your blood pressure cuff at least once a year so we can  complete comparative readings.  Stable.  Continue current medications.    - hydroCHLOROthiazide (HYDRODiuril) 25 mg tablet; Take 1 tablet (25 mg) by mouth once daily.  Dispense: 30 tablet; Refill: 11  - ECG 12 lead (Clinic Performed)      Medications refills will be completed as discussed.     Any labs or testing that is ordered will be reviewed and the results will be in your chart .   You can review these via  [employer].     Follow up six months for medication management.     Prescriptions will not be filled unless you are compliant with your follow-up appointments or have a follow-up appointment scheduled as per the instruction of your provider. Refills for medications should be requested at the time of your office visit.     Please allow one week for refill requests to be completed.     Contact office with any questions or concerns.   Preferred communication is via  [employer]  Please contact [email or web address] if having issues with  [employer]    Chelly DURAN-[facility]  [address], [address]  [place] [address]  Phone: [phone]    **Charting was completed using voice recognition technology and may include unintended errors**

## 2024-12-09 ENCOUNTER — APPOINTMENT (OUTPATIENT)
Dept: PRIMARY CARE | Facility: CLINIC | Age: 34
End: 2024-12-09
Payer: COMMERCIAL

## 2024-12-09 DIAGNOSIS — Z00.00 HEALTHCARE MAINTENANCE: Primary | ICD-10-CM

## 2024-12-09 DIAGNOSIS — E55.9 VITAMIN D DEFICIENCY: ICD-10-CM

## 2024-12-09 DIAGNOSIS — Z11.59 NEED FOR HEPATITIS C SCREENING TEST: ICD-10-CM

## 2025-01-14 ENCOUNTER — APPOINTMENT (OUTPATIENT)
Dept: PRIMARY CARE | Facility: CLINIC | Age: 35
End: 2025-01-14
Payer: COMMERCIAL

## 2025-01-14 ENCOUNTER — LAB (OUTPATIENT)
Dept: LAB | Facility: LAB | Age: 35
End: 2025-01-14
Payer: COMMERCIAL

## 2025-01-14 VITALS
DIASTOLIC BLOOD PRESSURE: 80 MMHG | OXYGEN SATURATION: 98 % | WEIGHT: 282 LBS | SYSTOLIC BLOOD PRESSURE: 126 MMHG | HEART RATE: 98 BPM | BODY MASS INDEX: 45.32 KG/M2 | HEIGHT: 66 IN

## 2025-01-14 DIAGNOSIS — E55.9 VITAMIN D DEFICIENCY: ICD-10-CM

## 2025-01-14 DIAGNOSIS — E66.813 CLASS 3 DRUG-INDUCED OBESITY WITHOUT SERIOUS COMORBIDITY WITH BODY MASS INDEX (BMI) OF 45.0 TO 49.9 IN ADULT: ICD-10-CM

## 2025-01-14 DIAGNOSIS — Z00.00 HEALTHCARE MAINTENANCE: ICD-10-CM

## 2025-01-14 DIAGNOSIS — Z11.59 NEED FOR HEPATITIS C SCREENING TEST: ICD-10-CM

## 2025-01-14 DIAGNOSIS — F41.1 GAD (GENERALIZED ANXIETY DISORDER): ICD-10-CM

## 2025-01-14 DIAGNOSIS — I10 HYPERTENSION, UNSPECIFIED TYPE: ICD-10-CM

## 2025-01-14 DIAGNOSIS — F34.1 PERSISTENT DEPRESSIVE DISORDER: Primary | ICD-10-CM

## 2025-01-14 DIAGNOSIS — E66.1 CLASS 3 DRUG-INDUCED OBESITY WITHOUT SERIOUS COMORBIDITY WITH BODY MASS INDEX (BMI) OF 45.0 TO 49.9 IN ADULT: ICD-10-CM

## 2025-01-14 PROBLEM — F32.5 MAJOR DEPRESSIVE DISORDER WITH SINGLE EPISODE, IN FULL REMISSION (CMS-HCC): Status: RESOLVED | Noted: 2023-11-16 | Resolved: 2025-01-14

## 2025-01-14 LAB
25(OH)D3 SERPL-MCNC: 42 NG/ML (ref 30–100)
ALBUMIN SERPL BCP-MCNC: 4.6 G/DL (ref 3.4–5)
ALP SERPL-CCNC: 38 U/L (ref 33–110)
ALT SERPL W P-5'-P-CCNC: 26 U/L (ref 7–45)
ANION GAP SERPL CALC-SCNC: 13 MMOL/L (ref 10–20)
APPEARANCE UR: CLEAR
AST SERPL W P-5'-P-CCNC: 21 U/L (ref 9–39)
BILIRUB SERPL-MCNC: 0.3 MG/DL (ref 0–1.2)
BILIRUB UR STRIP.AUTO-MCNC: NEGATIVE MG/DL
BUN SERPL-MCNC: 11 MG/DL (ref 6–23)
CALCIUM SERPL-MCNC: 9.5 MG/DL (ref 8.6–10.6)
CHLORIDE SERPL-SCNC: 101 MMOL/L (ref 98–107)
CHOLEST SERPL-MCNC: 150 MG/DL (ref 0–199)
CHOLESTEROL/HDL RATIO: 3.4
CO2 SERPL-SCNC: 30 MMOL/L (ref 21–32)
COLOR UR: NORMAL
CREAT SERPL-MCNC: 0.89 MG/DL (ref 0.5–1.05)
EGFRCR SERPLBLD CKD-EPI 2021: 88 ML/MIN/1.73M*2
ERYTHROCYTE [DISTWIDTH] IN BLOOD BY AUTOMATED COUNT: 13.7 % (ref 11.5–14.5)
GLUCOSE SERPL-MCNC: 78 MG/DL (ref 74–99)
GLUCOSE UR STRIP.AUTO-MCNC: NORMAL MG/DL
HCT VFR BLD AUTO: 45 % (ref 36–46)
HCV AB SER QL: NONREACTIVE
HDLC SERPL-MCNC: 43.6 MG/DL
HGB BLD-MCNC: 14.7 G/DL (ref 12–16)
KETONES UR STRIP.AUTO-MCNC: NEGATIVE MG/DL
LDLC SERPL CALC-MCNC: 86 MG/DL
LEUKOCYTE ESTERASE UR QL STRIP.AUTO: NEGATIVE
MCH RBC QN AUTO: 28.1 PG (ref 26–34)
MCHC RBC AUTO-ENTMCNC: 32.7 G/DL (ref 32–36)
MCV RBC AUTO: 86 FL (ref 80–100)
NITRITE UR QL STRIP.AUTO: NEGATIVE
NON HDL CHOLESTEROL: 106 MG/DL (ref 0–149)
NRBC BLD-RTO: 0 /100 WBCS (ref 0–0)
PH UR STRIP.AUTO: 6.5 [PH]
PLATELET # BLD AUTO: 331 X10*3/UL (ref 150–450)
POTASSIUM SERPL-SCNC: 4.5 MMOL/L (ref 3.5–5.3)
PROT SERPL-MCNC: 7.4 G/DL (ref 6.4–8.2)
PROT UR STRIP.AUTO-MCNC: NEGATIVE MG/DL
RBC # BLD AUTO: 5.24 X10*6/UL (ref 4–5.2)
RBC # UR STRIP.AUTO: NEGATIVE /UL
SODIUM SERPL-SCNC: 139 MMOL/L (ref 136–145)
SP GR UR STRIP.AUTO: 1.01
TRIGL SERPL-MCNC: 101 MG/DL (ref 0–149)
TSH SERPL-ACNC: 1.97 MIU/L (ref 0.44–3.98)
UROBILINOGEN UR STRIP.AUTO-MCNC: NORMAL MG/DL
VLDL: 20 MG/DL (ref 0–40)
WBC # BLD AUTO: 11 X10*3/UL (ref 4.4–11.3)

## 2025-01-14 PROCEDURE — 82306 VITAMIN D 25 HYDROXY: CPT

## 2025-01-14 PROCEDURE — 3008F BODY MASS INDEX DOCD: CPT | Performed by: NURSE PRACTITIONER

## 2025-01-14 PROCEDURE — 84443 ASSAY THYROID STIM HORMONE: CPT

## 2025-01-14 PROCEDURE — 85027 COMPLETE CBC AUTOMATED: CPT

## 2025-01-14 PROCEDURE — 80061 LIPID PANEL: CPT

## 2025-01-14 PROCEDURE — 99395 PREV VISIT EST AGE 18-39: CPT | Performed by: NURSE PRACTITIONER

## 2025-01-14 PROCEDURE — 80053 COMPREHEN METABOLIC PANEL: CPT

## 2025-01-14 PROCEDURE — 99214 OFFICE O/P EST MOD 30 MIN: CPT | Performed by: NURSE PRACTITIONER

## 2025-01-14 PROCEDURE — 3079F DIAST BP 80-89 MM HG: CPT | Performed by: NURSE PRACTITIONER

## 2025-01-14 PROCEDURE — 81003 URINALYSIS AUTO W/O SCOPE: CPT

## 2025-01-14 PROCEDURE — 3074F SYST BP LT 130 MM HG: CPT | Performed by: NURSE PRACTITIONER

## 2025-01-14 PROCEDURE — 86803 HEPATITIS C AB TEST: CPT

## 2025-01-14 PROCEDURE — 1036F TOBACCO NON-USER: CPT | Performed by: NURSE PRACTITIONER

## 2025-01-14 RX ORDER — ERGOCALCIFEROL 1.25 MG/1
50000 CAPSULE ORAL
Qty: 13 CAPSULE | Refills: 3 | Status: SHIPPED | OUTPATIENT
Start: 2025-01-14

## 2025-01-14 RX ORDER — HYDROCHLOROTHIAZIDE 25 MG/1
25 TABLET ORAL DAILY
Qty: 90 TABLET | Refills: 1 | Status: SHIPPED | OUTPATIENT
Start: 2025-01-14

## 2025-01-14 RX ORDER — SERTRALINE HYDROCHLORIDE 100 MG/1
100 TABLET, FILM COATED ORAL DAILY
Qty: 90 TABLET | Refills: 1 | Status: SHIPPED | OUTPATIENT
Start: 2025-01-14

## 2025-01-14 ASSESSMENT — PROMIS GLOBAL HEALTH SCALE
RATE_AVERAGE_PAIN: 0
RATE_QUALITY_OF_LIFE: VERY GOOD
RATE_GENERAL_HEALTH: GOOD
RATE_PHYSICAL_HEALTH: GOOD
CARRYOUT_PHYSICAL_ACTIVITIES: MOSTLY
RATE_MENTAL_HEALTH: VERY GOOD
RATE_AVERAGE_FATIGUE: MILD
CARRYOUT_SOCIAL_ACTIVITIES: VERY GOOD
RATE_SOCIAL_SATISFACTION: VERY GOOD
EMOTIONAL_PROBLEMS: RARELY

## 2025-01-14 NOTE — PROGRESS NOTES
Annual Comprehensive Medical Exam:    34 y.o. female presents for annual comprehensive medical evaluation and preventive services screening.      Recent hospitalizations, surgeries or ER visits: denies     Diet:   mix of healthy and unhealthy  Caffeine per day: 24 ounces  Water per day:  32 ounces   Exercise: exercise bike at times   Alcohol: 2 per week  Tobacco: denies  Pap/Pelvic: 11/28/23 Aida Stevens CNP     Allowed to report any questions or concerns.     Anxiety and Depression:  Med: Zoloft  Feels stable on current dose.    Denies thoughts of suicide or homicide.     Hypertension:   Med: HCTZ   Blood pressures at home:   Tolerating without side effects      Morbid obesity: Eugenio Bradshaw Pharm D  Med: Contrave  Semaglutide 16 mg weekly--started the end of Nov       Birth control: Gyn  iAda Stevens CNP     Past Medical History:   Diagnosis Date    Kidney stones     Major depressive disorder, single episode, mild (CMS-HCC) 01/29/2020    Major depressive disorder, single episode, mild degree    Other general symptoms and signs 02/16/2018    Flu-like symptoms    Personal history of other diseases of the nervous system and sense organs     History of myopia    Personal history of other specified conditions 02/16/2018    History of fever    Pharyngitis 11/16/2023      Past Surgical History:   Procedure Laterality Date    OTHER SURGICAL HISTORY  09/19/2019    [place] tooth extraction    WISDOM TOOTH EXTRACTION       Family History   Problem Relation Name Age of Onset    Hypertension Mother Lawanda Lam     Blood clot Father Emilio Lam     Hypertension Maternal Grandmother Ivory Beasley     Pancreatic cancer Maternal Grandmother Ivory Beasley       Social History     Socioeconomic History    Marital status: Single     Spouse name: Not on file    Number of children: Not on file    Years of education: Not on file    Highest education level: Not on file   Occupational History    Occupation: [employer]   Tobacco Use    Smoking  "status: Never    Smokeless tobacco: Never   Substance and Sexual Activity    Alcohol use: Yes     Alcohol/week: 2.0 standard drinks of alcohol     Types: 2 Standard drinks or equivalent per week    Drug use: Never    Sexual activity: Never   Other Topics Concern    Not on file   Social History Narrative    Not on file     Social Drivers of Health     Financial Resource Strain: Not on file   Food Insecurity: Not on file   Transportation Needs: Not on file   Physical Activity: Not on file   Stress: Not on file   Social Connections: Not on file   Intimate Partner Violence: Not on file   Housing Stability: Not on file       Current Outpatient Medications on File Prior to Visit   Medication Sig Dispense Refill    cholecalciferol, vitamin D3, (VITAMIN D3 ORAL) Take by mouth.      hydroCHLOROthiazide (HYDRODiuril) 25 mg tablet Take 1 tablet (25 mg) by mouth once daily. 90 tablet 1    naltrexone-bupropion (Contrave) 8-90 mg ER tablet Take 2 tablets by mouth 2 times a day. 120 tablet 11    norethindrone (Micronor) 0.35 mg tablet Take 1 tablet (0.35 mg) by mouth once daily. 84 tablet 3    sertraline (Zoloft) 100 mg tablet Take 1 tablet (100 mg) by mouth once daily. 90 tablet 1     No current facility-administered medications on file prior to visit.       No Known Allergies      Visit Vitals  /80   Pulse 98   Ht 1.676 m (5' 6\")   Wt 128 kg (282 lb)   SpO2 98%   BMI 45.52 kg/m²   OB Status Having periods   Smoking Status Never   BSA 2.44 m²        Physical Exam  Gen: Alert and oriented x3 female in no acute distress.  HEENT: Head is normocephalic.  Neck is supple without carotid bruits  Heart: Regular rate and rhythm without murmurs.  Lungs: Clear to auscultation bilaterally.  Breast:            Deferred to Gyn  Pelvic:            Deferred to Gyn   Abdomen: Soft with normal bowel sounds.  No masses or pain to palpation.   Extremities: Good range of motion of all joints.  No significant edema. Pedal pulses +1-2/4  Neuro: " No signs of focal neurologic deficit.  No tremor.  Speech and hearing are normal.  DTRs +3/4;  Muscle Strength +5/5.  Musculoskeletal: Spine with good ROM.  Leg lengths are equal.  Skin: No significant or irregular nevi visualized.  Psych: normal affect.  No suicidal ideation.  Good judgment and insight.    Diagnosis/Plan:     1. Healthcare maintenance  Labs were reviewed with patient and will be available in chart.      2. Hypertension, unspecified type     Discussed importance of good blood pressure control to avoid long-term complications such as heart attack and stroke.  Patient is aware that blood pressure goal is less than 130/80.   Maintaining a regular exercise program and body mass index (BMI) less than 25 as well as a diet lower in carbohydrates will help reach these goals.   If you are monitoring your blood pressure at home, please bring your blood pressure cuff at least once a year so we can complete comparative readings.  Stable.  Continue current medications.    - hydroCHLOROthiazide (HYDRODiuril) 25 mg tablet; Take 1 tablet (25 mg) by mouth once daily.  Dispense: 90 tablet; Refill: 1    3. NED (generalized anxiety disorder)  Stable.   Continue current medication/treatment plan.     Aware at any time if thoughts of suicide or homicide are present contact medical services immediately.    - sertraline (Zoloft) 100 mg tablet; Take 1 tablet (100 mg) by mouth once daily.  Dispense: 90 tablet; Refill: 1    4. Persistent depressive disorder (Primary)    Stable.   Continue current medication/treatment plan.     Aware at any time if thoughts of suicide or homicide are present contact medical services immediately.    - sertraline (Zoloft) 100 mg tablet; Take 1 tablet (100 mg) by mouth once daily.  Dispense: 90 tablet; Refill: 1    5. Vitamin D deficiency    - Vitamin D 25-Hydroxy,Total (for eval of Vitamin D levels); Future      6. Class 3 drug-induced obesity without serious comorbidity with body mass index  (BMI) of 45.0 to 49.9 in adult  Follow up with Pharm D      Medications refills will be completed as discussed.     Any labs or testing that is ordered will be reviewed and the results will be in your chart .   You can review these via  [employer].     Follow up six months for medication management.     Prescriptions will not be filled unless you are compliant with your follow-up appointments or have a follow-up appointment scheduled as per the instruction of your provider. Refills for medications should be requested at the time of your office visit.     Please allow one week for refill requests to be completed.     [facility] can help with scheduling referrals: [phone]   Mammogram [phone]  Physical Therapy [phone]    Contact office with any questions or concerns.   Preferred communication is via  [employer]  Please contact [email or web address] if having issues with  [employer]    Chelly Wong APRALETHEA-[facility]  [address], [address]  [place] [address]  Phone: [phone]    **Charting was completed using voice recognition technology and may include unintended errors**

## 2025-07-15 ENCOUNTER — APPOINTMENT (OUTPATIENT)
Dept: PRIMARY CARE | Facility: CLINIC | Age: 35
End: 2025-07-15
Payer: COMMERCIAL

## 2025-07-15 VITALS
DIASTOLIC BLOOD PRESSURE: 80 MMHG | HEART RATE: 82 BPM | SYSTOLIC BLOOD PRESSURE: 118 MMHG | HEIGHT: 66 IN | BODY MASS INDEX: 41.14 KG/M2 | OXYGEN SATURATION: 98 % | WEIGHT: 256 LBS

## 2025-07-15 DIAGNOSIS — I10 HYPERTENSION, UNSPECIFIED TYPE: Primary | ICD-10-CM

## 2025-07-15 DIAGNOSIS — R19.7 DIARRHEA, UNSPECIFIED TYPE: ICD-10-CM

## 2025-07-15 DIAGNOSIS — F34.1 PERSISTENT DEPRESSIVE DISORDER: ICD-10-CM

## 2025-07-15 DIAGNOSIS — E66.01 MORBID OBESITY WITH BMI OF 40.0-44.9, ADULT (MULTI): ICD-10-CM

## 2025-07-15 DIAGNOSIS — F41.1 GAD (GENERALIZED ANXIETY DISORDER): ICD-10-CM

## 2025-07-15 PROCEDURE — 99214 OFFICE O/P EST MOD 30 MIN: CPT | Performed by: NURSE PRACTITIONER

## 2025-07-15 PROCEDURE — 3074F SYST BP LT 130 MM HG: CPT | Performed by: NURSE PRACTITIONER

## 2025-07-15 PROCEDURE — 3079F DIAST BP 80-89 MM HG: CPT | Performed by: NURSE PRACTITIONER

## 2025-07-15 PROCEDURE — 3008F BODY MASS INDEX DOCD: CPT | Performed by: NURSE PRACTITIONER

## 2025-07-15 NOTE — PROGRESS NOTES
General Medical Management Note    34 y.o. female presents for Medical Management  HPI    Denies recent hospitalizations, surgeries or ER visits    Diet:   mix of healthy and unhealthy  Caffeine per day: 24 ounces  Water per day:  60-80 ounces   Exercise: walking    Alcohol: 2 per week  Tobacco: denies  Pap/Pelvic: 11/28/23 Aida Stevens CNP     Allowed to report any questions or concerns.     Recently moved and her pharmacy was further away so she did not  her meds    Anxiety and Depression:  Med: Zoloft (off for past 2 months)   Feels stable off of med    Denies thoughts of suicide or homicide.     Hypertension:   Med: HCTZ (off for past 2 months)   Blood pressures at home: 107/82, 120/70s  Tolerating without side effects      Morbid obesity: Hers Website   Semaglutide  weekly      Birth control: Gyn  Aida Stevens CNP    Past couple weeks feeling more bloating and having some diarrhea (no blood)   Doesn't feel that her diet has changed.     Medical History[1]   Surgical History[2]  Family History[3]   Social History     Socioeconomic History    Marital status: Single     Spouse name: Not on file    Number of children: Not on file    Years of education: Not on file    Highest education level: Not on file   Occupational History    Occupation: [employer] tech   Tobacco Use    Smoking status: Never    Smokeless tobacco: Never   Substance and Sexual Activity    Alcohol use: Yes     Alcohol/week: 2.0 standard drinks of alcohol     Types: 2 Standard drinks or equivalent per week    Drug use: Never    Sexual activity: Never   Other Topics Concern    Not on file   Social History Narrative    Not on file     Social Drivers of Health     Financial Resource Strain: Not on file   Food Insecurity: Not on file   Transportation Needs: Not on file   Physical Activity: Not on file   Stress: Not on file   Social Connections: Not on file   Intimate Partner Violence: Not on file   Housing Stability: Not on file       Medications  "Ordered Prior to Encounter[4]    Allergies[5]      Visit Vitals  /80   Pulse 82   Ht 1.676 m (5' 6\")   Wt 116 kg (256 lb)   SpO2 98%   BMI 41.32 kg/m²   OB Status Having periods   Smoking Status Never   BSA 2.32 m²        PHYSICAL EXAM:  Alert and oriented x 3  Neck supple without carotid bruit   Heart regular rate and rhythm without murmur  Lungs clear to auscultation.  Legs without edema.  Gait is non-antalgic  Speech clear.  Hearing adequate.  Psych: Normal affect. Good judgment and insight.       DIAGNOSIS/PLAN:    1. Hypertension, unspecified type   Discussed importance of good blood pressure control to avoid long-term complications such as heart attack and stroke.  Patient is aware that blood pressure goal is less than 130/80.   Maintaining a regular exercise program and body mass index (BMI) less than 25 as well as a diet lower in carbohydrates will help reach these goals.   If you are monitoring your blood pressure at home, please bring your blood pressure cuff at least once a year so we can complete comparative readings.    Please check your b/p and if trending 130/80s you will need to restart your Hydrochlorothiazide.       2. NED (generalized anxiety disorder)   Stable.   Please contact me if at any time you would like to restart your Zoloft.   Aware at any time if thoughts of suicide or homicide are present contact medical services immediately.      3. Persistent depressive disorder  Stable.   Please contact me if at any time you would like to restart your Zoloft.   Aware at any time if thoughts of suicide or homicide are present contact medical services immediately.    4. Morbid obesity with BMI of 40.0-44.9, adult (Multi)  Obtaining Semaglutide via [facility] Website  Patient encouraged to follow diet of lower carbohydrates and increase vegetable and fruit intake.   Patient also encouraged to increase water intake to 80 ounces/day.   Start or continue exercise for at least 30 minutes a day on most days " of the week.     offers various ways to learn about healthy eating and healthy weight loss.     Helpful Websites:     http://www.myplate.gov    Https://diabetes.org/food-nutrition/understanding-carbs/carb-counting-and-diabetes     http://[employer]: resource for finding low-carb meal plans, snack lists and tons of recipes.    https://www.heart.org/en/healthy-living/healthy-lifestyle/lifes-essential-8: American Heart Association's Website     5. Diarrhea, unspecified   Keep a food log  Increase fiber in diet  Potential it may be related to Semaglutide-speak with prescriber  Eat a bland diet for a few days.   Add a probiotic  Update if continues    Medications refills will be completed as discussed.     Any labs or testing that is ordered will be reviewed and the results will be in your chart .   You can review these via  [employer].     Follow up six months for complete physical exam after 1/14/26    Prescriptions will not be filled unless you are compliant with your follow-up appointments or have a follow-up appointment scheduled as per the instruction of your provider. Refills for medications should be requested at the time of your office visit.     Please allow one week for refill requests to be completed.     Contact office with any questions or concerns.   Preferred communication is via  [employer]  Please contact [email or web address] if having issues with  [employer]    Chelly DURAN-[facility]  [address], [address]  [place] [address]  Phone: [phone]    **Charting was completed using voice recognition technology and may include unintended errors**         [1]   Past Medical History:  Diagnosis Date    Kidney stones     Major depressive disorder, single episode, mild 01/29/2020    Major depressive disorder, single episode, mild degree    Other general symptoms and signs 02/16/2018    Flu-like symptoms    Personal history of other diseases of  the nervous system and sense organs     History of myopia    Personal history of other specified conditions 02/16/2018    History of fever    Pharyngitis 11/16/2023   [2]   Past Surgical History:  Procedure Laterality Date    OTHER SURGICAL HISTORY  09/19/2019    [place] tooth extraction    WISDOM TOOTH EXTRACTION     [3]   Family History  Problem Relation Name Age of Onset    Hypertension Mother Lawanda Lam     Deep vein thrombosis Father Emilio Lam     Hypertension Maternal Grandmother Ivory Beasley     Pancreatic cancer Maternal Grandmother Ivory Beasley     Clotting disorder Father Emilio Lam     Blood clot Father Emilio Lam    [4]   Current Outpatient Medications on File Prior to Visit   Medication Sig Dispense Refill    cholecalciferol, vitamin D3, (VITAMIN D3 ORAL) Take by mouth.      ergocalciferol (Vitamin D-2) 1.25 MG (36839 UT) capsule Take 1 capsule (50,000 Units) by mouth 1 (one) time per week. 13 capsule 3    hydroCHLOROthiazide (HYDRODiuril) 25 mg tablet Take 1 tablet (25 mg) by mouth once daily. 90 tablet 1    naltrexone-bupropion (Contrave) 8-90 mg ER tablet Take 2 tablets by mouth 2 times a day. 120 tablet 11    norethindrone (Micronor) 0.35 mg tablet Take 1 tablet (0.35 mg) by mouth once daily. 84 tablet 3    SEMAGLUTIDE SUBQ Inject under the skin.      sertraline (Zoloft) 100 mg tablet Take 1 tablet (100 mg) by mouth once daily. 90 tablet 1     No current facility-administered medications on file prior to visit.   [5] No Known Allergies

## 2025-07-16 PROBLEM — R19.7 DIARRHEA: Status: ACTIVE | Noted: 2025-07-16

## 2025-07-16 PROBLEM — Z00.00 ENCOUNTER FOR HEALTH MAINTENANCE EXAMINATION IN ADULT: Status: RESOLVED | Noted: 2023-12-04 | Resolved: 2025-07-16

## 2026-01-19 ENCOUNTER — APPOINTMENT (OUTPATIENT)
Dept: PRIMARY CARE | Facility: CLINIC | Age: 36
End: 2026-01-19
Payer: COMMERCIAL